# Patient Record
Sex: FEMALE | Race: BLACK OR AFRICAN AMERICAN | NOT HISPANIC OR LATINO | URBAN - METROPOLITAN AREA
[De-identification: names, ages, dates, MRNs, and addresses within clinical notes are randomized per-mention and may not be internally consistent; named-entity substitution may affect disease eponyms.]

---

## 2017-06-05 ENCOUNTER — EMERGENCY (EMERGENCY)
Facility: HOSPITAL | Age: 27
LOS: 1 days | Discharge: HOME | End: 2017-06-05
Attending: EMERGENCY MEDICINE | Admitting: EMERGENCY MEDICINE
Payer: MEDICAID

## 2017-06-05 VITALS
RESPIRATION RATE: 18 BRPM | SYSTOLIC BLOOD PRESSURE: 123 MMHG | HEART RATE: 62 BPM | DIASTOLIC BLOOD PRESSURE: 73 MMHG | OXYGEN SATURATION: 100 %

## 2017-06-05 VITALS
OXYGEN SATURATION: 100 % | TEMPERATURE: 98 F | DIASTOLIC BLOOD PRESSURE: 73 MMHG | HEART RATE: 65 BPM | RESPIRATION RATE: 17 BRPM | SYSTOLIC BLOOD PRESSURE: 125 MMHG

## 2017-06-05 DIAGNOSIS — R51 HEADACHE: ICD-10-CM

## 2017-06-05 LAB
ALBUMIN SERPL ELPH-MCNC: 3.9 G/DL — SIGNIFICANT CHANGE UP (ref 3.3–5)
ALP SERPL-CCNC: 44 U/L — SIGNIFICANT CHANGE UP (ref 40–120)
ALT FLD-CCNC: 9 U/L — SIGNIFICANT CHANGE UP (ref 4–33)
APPEARANCE UR: SIGNIFICANT CHANGE UP
AST SERPL-CCNC: 16 U/L — SIGNIFICANT CHANGE UP (ref 4–32)
BACTERIA # UR AUTO: SIGNIFICANT CHANGE UP
BASOPHILS # BLD AUTO: 0.02 K/UL — SIGNIFICANT CHANGE UP (ref 0–0.2)
BASOPHILS NFR BLD AUTO: 0.3 % — SIGNIFICANT CHANGE UP (ref 0–2)
BILIRUB SERPL-MCNC: 0.3 MG/DL — SIGNIFICANT CHANGE UP (ref 0.2–1.2)
BILIRUB UR-MCNC: NEGATIVE — SIGNIFICANT CHANGE UP
BLOOD UR QL VISUAL: NEGATIVE — SIGNIFICANT CHANGE UP
BUN SERPL-MCNC: 8 MG/DL — SIGNIFICANT CHANGE UP (ref 7–23)
CALCIUM SERPL-MCNC: 9.2 MG/DL — SIGNIFICANT CHANGE UP (ref 8.4–10.5)
CHLORIDE SERPL-SCNC: 98 MMOL/L — SIGNIFICANT CHANGE UP (ref 98–107)
CO2 SERPL-SCNC: 22 MMOL/L — SIGNIFICANT CHANGE UP (ref 22–31)
COLOR SPEC: SIGNIFICANT CHANGE UP
CREAT SERPL-MCNC: 0.74 MG/DL — SIGNIFICANT CHANGE UP (ref 0.5–1.3)
EOSINOPHIL # BLD AUTO: 0.13 K/UL — SIGNIFICANT CHANGE UP (ref 0–0.5)
EOSINOPHIL NFR BLD AUTO: 1.7 % — SIGNIFICANT CHANGE UP (ref 0–6)
GLUCOSE SERPL-MCNC: 104 MG/DL — HIGH (ref 70–99)
GLUCOSE UR-MCNC: NEGATIVE — SIGNIFICANT CHANGE UP
HCG SERPL-ACNC: SIGNIFICANT CHANGE UP MIU/ML
HCT VFR BLD CALC: 35 % — SIGNIFICANT CHANGE UP (ref 34.5–45)
HGB BLD-MCNC: 11.3 G/DL — LOW (ref 11.5–15.5)
IMM GRANULOCYTES NFR BLD AUTO: 0.4 % — SIGNIFICANT CHANGE UP (ref 0–1.5)
KETONES UR-MCNC: SIGNIFICANT CHANGE UP
LEUKOCYTE ESTERASE UR-ACNC: NEGATIVE — SIGNIFICANT CHANGE UP
LYMPHOCYTES # BLD AUTO: 2.64 K/UL — SIGNIFICANT CHANGE UP (ref 1–3.3)
LYMPHOCYTES # BLD AUTO: 34.6 % — SIGNIFICANT CHANGE UP (ref 13–44)
MCHC RBC-ENTMCNC: 26.4 PG — LOW (ref 27–34)
MCHC RBC-ENTMCNC: 32.3 % — SIGNIFICANT CHANGE UP (ref 32–36)
MCV RBC AUTO: 81.8 FL — SIGNIFICANT CHANGE UP (ref 80–100)
MONOCYTES # BLD AUTO: 0.58 K/UL — SIGNIFICANT CHANGE UP (ref 0–0.9)
MONOCYTES NFR BLD AUTO: 7.6 % — SIGNIFICANT CHANGE UP (ref 2–14)
MUCOUS THREADS # UR AUTO: SIGNIFICANT CHANGE UP
NEUTROPHILS # BLD AUTO: 4.23 K/UL — SIGNIFICANT CHANGE UP (ref 1.8–7.4)
NEUTROPHILS NFR BLD AUTO: 55.4 % — SIGNIFICANT CHANGE UP (ref 43–77)
NITRITE UR-MCNC: POSITIVE — HIGH
PH UR: 6.5 — SIGNIFICANT CHANGE UP (ref 4.6–8)
PLATELET # BLD AUTO: 328 K/UL — SIGNIFICANT CHANGE UP (ref 150–400)
PMV BLD: 9.8 FL — SIGNIFICANT CHANGE UP (ref 7–13)
POTASSIUM SERPL-MCNC: 4.4 MMOL/L — SIGNIFICANT CHANGE UP (ref 3.5–5.3)
POTASSIUM SERPL-SCNC: 4.4 MMOL/L — SIGNIFICANT CHANGE UP (ref 3.5–5.3)
PROT SERPL-MCNC: 7.4 G/DL — SIGNIFICANT CHANGE UP (ref 6–8.3)
PROT UR-MCNC: 10 — SIGNIFICANT CHANGE UP
RBC # BLD: 4.28 M/UL — SIGNIFICANT CHANGE UP (ref 3.8–5.2)
RBC # FLD: 16.4 % — HIGH (ref 10.3–14.5)
RBC CASTS # UR COMP ASSIST: SIGNIFICANT CHANGE UP (ref 0–?)
SODIUM SERPL-SCNC: 137 MMOL/L — SIGNIFICANT CHANGE UP (ref 135–145)
SP GR SPEC: 1.01 — SIGNIFICANT CHANGE UP (ref 1–1.03)
SQUAMOUS # UR AUTO: SIGNIFICANT CHANGE UP
UROBILINOGEN FLD QL: NORMAL E.U. — SIGNIFICANT CHANGE UP (ref 0.1–0.2)
WBC # BLD: 7.63 K/UL — SIGNIFICANT CHANGE UP (ref 3.8–10.5)
WBC # FLD AUTO: 7.63 K/UL — SIGNIFICANT CHANGE UP (ref 3.8–10.5)
WBC UR QL: SIGNIFICANT CHANGE UP (ref 0–?)

## 2017-06-05 PROCEDURE — 99285 EMERGENCY DEPT VISIT HI MDM: CPT | Mod: 25

## 2017-06-05 PROCEDURE — 70551 MRI BRAIN STEM W/O DYE: CPT | Mod: 26

## 2017-06-05 PROCEDURE — 76815 OB US LIMITED FETUS(S): CPT | Mod: 26

## 2017-06-05 RX ORDER — NITROFURANTOIN MACROCRYSTAL 50 MG
1 CAPSULE ORAL
Qty: 20 | Refills: 0 | OUTPATIENT
Start: 2017-06-05 | End: 2017-06-15

## 2017-06-05 RX ORDER — METOCLOPRAMIDE HCL 10 MG
10 TABLET ORAL ONCE
Qty: 0 | Refills: 0 | Status: COMPLETED | OUTPATIENT
Start: 2017-06-05 | End: 2017-06-05

## 2017-06-05 RX ORDER — NITROFURANTOIN MACROCRYSTAL 50 MG
100 CAPSULE ORAL ONCE
Qty: 0 | Refills: 0 | Status: COMPLETED | OUTPATIENT
Start: 2017-06-05 | End: 2017-06-05

## 2017-06-05 RX ORDER — SODIUM CHLORIDE 9 MG/ML
1000 INJECTION INTRAMUSCULAR; INTRAVENOUS; SUBCUTANEOUS ONCE
Qty: 0 | Refills: 0 | Status: COMPLETED | OUTPATIENT
Start: 2017-06-05 | End: 2017-06-05

## 2017-06-05 RX ADMIN — Medication 100 MILLIGRAM(S): at 17:37

## 2017-06-05 RX ADMIN — Medication 10 MILLIGRAM(S): at 16:48

## 2017-06-05 RX ADMIN — SODIUM CHLORIDE 2000 MILLILITER(S): 9 INJECTION INTRAMUSCULAR; INTRAVENOUS; SUBCUTANEOUS at 16:48

## 2017-06-05 NOTE — CONSULT NOTE ADULT - PROBLEM SELECTOR RECOMMENDATION 9
- will f/u results of MRI/MRV  - symptomatic migraine management as per ED team  - if imaging unremarkable, can f/u w/ Neurology as outpatient as needed

## 2017-06-05 NOTE — ED PROVIDER NOTE - ATTENDING CONTRIBUTION TO CARE
Dr. Maldonado: This H&P has been written by myself in its entirety  Dr. Maldonado: I have personally performed a face to face bedside history and physical examination of this patient. I have discussed the history, examination, review of systems, assessment and plan of management with the resident. I have reviewed the electronic medical record and amended it to reflect my history, review of systems, physical exam, assessment and plan.

## 2017-06-05 NOTE — ED ADULT NURSE NOTE - OBJECTIVE STATEMENT
pt c.o. headache, dizzyness, states she walked into a wall. states she is pregnant unsure of dates. bedside sono in progress. sl placed labs sent. to mri via wheelchair. denies blurry vision and head trauma

## 2017-06-05 NOTE — CONSULT NOTE ADULT - ASSESSMENT
26 y/o AA F w/ PMHx of migraine p/w 2d h/o HA similar in nature to migraine, but worse in severity. Etiology likely migraine, however, given pregnancy status and worse severity than regular migraines, will r/o venous sinus thrombosis.

## 2017-06-05 NOTE — ED PROVIDER NOTE - CARE PLAN
Principal Discharge DX:	Acute nonintractable headache, unspecified headache type Principal Discharge DX:	Acute nonintractable headache, unspecified headache type  Secondary Diagnosis:	Infective urethritis

## 2017-06-05 NOTE — ED ADULT TRIAGE NOTE - CHIEF COMPLAINT QUOTE
pt states she is approx 11 w preg as per a home preg test c/o of abd cramping, denies bleeding/discharge. has not seen a GYN with this pregnancy.

## 2017-06-05 NOTE — ED PROVIDER NOTE - EYES, MLM
Clear bilaterally, pupils equal, round and reactive to light. +papilledema bilaterally Clear bilaterally, pupils equal, round and reactive to light.

## 2017-06-05 NOTE — ED PROVIDER NOTE - OBJECTIVE STATEMENT
Dr. Maldonado: 27F  ~11 weeks pregnant (unsure of dates, no documented IUP, LMP 2 mos ago, irregular period), h/o migraines (usually takes Motrin), p/w 2 days of R sided throbbing headache different from usual migraines. Did not take anything for her HA as she is allergic to Tylenol. No nausea or vomiting, no blurry vision, one episode of yesterday where she "walked into a wall", now resolved. No abdo pain or vag bleeding. Fam h/o sinusitis and states has had sinusitis recently. No fevers or chills. +photophobia and phonophobia. No sudden onset HA. No fam h/o aneurysms or blood clots.  Pt had a miscarriage in Oct 2016. Dr. Maldonado: 27F  ~11 weeks pregnant (unsure of dates, no documented IUP, LMP 2 mos ago, irregular period), h/o migraines (usually takes Motrin), p/w 2 days of R sided throbbing headache different from usual migraines. Did not take anything for her HA as she is allergic to Tylenol. No nausea or vomiting, no blurry vision, one episode of yesterday where she "walked into a wall", now resolved. No abdo pain or vag bleeding. Fam h/o sinusitis and states has had sinusitis recently. No fevers or chills. +photophobia and phonophobia. No sudden onset HA. No fam h/o aneurysms or blood clots.  Pt had a miscarriage in Oct 2016.    Dr. Crandall:  27 y.o. female PMH migraines 7 weeks pregnant p/w headaches and low back pain x 2 days.  The headaches are different from her usual migraines.  She has a bad reaction to tylenol, and has not been taking NSAIDS due to her pregnancy.  She also had some dizziness.  NO personal or family history of blood clots.

## 2017-06-05 NOTE — CONSULT NOTE ADULT - SUBJECTIVE AND OBJECTIVE BOX
Neurology Consult    Patient is a 27y old  Female who presents with a chief complaint of headache.    HPI:27 y/o AA F PMHx migraines w/ aura, 11 wks pregnant p/w 2 day history of gradual onset R sided throbbing HA, worse at night, at its maximum 15/10, radiating to R temporal and R neck, associated w/ light sensitivity. Currently, pain 5/10. Migraines usually R periorbital/frontal, not as severe, assoc. w/ photophobia and phonophobia. Associated symptoms: nausea/vomiting (from before 2/2 to pregnancy), chills.  Denies fever, weakness, sensory changes, abd pain, chest pain, dysarthria, dysphagia.      PAST MEDICAL & SURGICAL HISTORY:  Migraines  No pertinent past medical history  No significant past surgical history      Allergies    Benadryl (Hives)  cortisone (Hives)  pcn (Unknown)  Tylenol (Hives)  Tylenol (Urticaria)  Zofran (Anaphylaxis)  Zofran (Rash)    Intolerances        MEDICATIONS  (STANDING):  metoclopramide Injectable 10milliGRAM(s) IV Push once  sodium chloride 0.9% Bolus 1000milliLiter(s) IV Bolus once    MEDICATIONS  (PRN):      Social History: Denies tob, EtOH use     FAMILY HISTORY:      Review of Systems:  CONSTITUTIONAL:  No weight loss, fever, chills, weakness or fatigue.  HEENT:  Eyes:  No visual loss, blurred vision, double vision or yellow sclerae. Ears, Nose, Throat:  No hearing loss, sneezing, congestion, runny nose or sore throat.  SKIN:  No rash or itching.  CARDIOVASCULAR:  No chest pain, chest pressure or chest discomfort. No palpitations or edema.  RESPIRATORY:  No shortness of breath, cough or sputum.  GASTROINTESTINAL:  No anorexia, nausea, vomiting or diarrhea. No abdominal pain or blood.  GENITOURINARY:  Burning on urination. Pregnancy. Last menstrual period, MM/DD/YYYY.  NEUROLOGICAL:  No headache, dizziness, syncope, paralysis, ataxia, numbness or tingling in the extremities. No change in bowel or bladder control.  MUSCULOSKELETAL:  No muscle, back pain, joint pain or stiffness.  HEMATOLOGIC:  No anemia, bleeding or bruising.  LYMPHATICS:  No enlarged nodes. No history of splenectomy.  PSYCHIATRIC:  No history of depression or anxiety.  ENDOCRINOLOGIC:  No reports of sweating, cold or heat intolerance. No polyuria or polydipsia.      Physical Exam:   Vital Signs Last 24 Hrs  T(C): 36.8, Max: 36.8 (06-05 @ 13:10)  T(F): 98.2, Max: 98.2 (06-05 @ 13:10)  HR: 65 (65 - 65)  BP: 125/73 (125/73 - 125/73)  BP(mean): --  RR: 17 (17 - 17)  SpO2: 100% (100% - 100%)    General Exam:   General appearance: No acute distress                 Neurological Exam:  Mental Status: AAOx3, fluent speech, follows simple commands, able to name  Cranial Nerves: EOMI, PERRL, VFF, V1-V3 intact, facial symmetric, no dysarthria, tongue midline  Motor: strength 5/5 throughout. No drift x4  Sensation: Intact to LT throughout  Coordination: FTN intact b/l  Reflexes: 1+ bilateral biceps, brachioradialis, patellar and ankle  Gait: normal and stable.      Labs:     CBC Full  -  ( 05 Jun 2017 15:28 )  WBC Count : 7.63 K/uL  Hemoglobin : 11.3 g/dL  Hematocrit : 35.0 %  Platelet Count - Automated : 328 K/uL  Mean Cell Volume : 81.8 fL  Mean Cell Hemoglobin : 26.4 pg  Mean Cell Hemoglobin Concentration : 32.3 %  Auto Neutrophil # : 4.23 K/uL  Auto Lymphocyte # : 2.64 K/uL  Auto Monocyte # : 0.58 K/uL  Auto Eosinophil # : 0.13 K/uL  Auto Basophil # : 0.02 K/uL  Auto Neutrophil % : 55.4 %  Auto Lymphocyte % : 34.6 %  Auto Monocyte % : 7.6 %  Auto Eosinophil % : 1.7 %  Auto Basophil % : 0.3 %    06-05    137  |  98  |  8   ----------------------------<  104<H>  4.4   |  22  |  0.74    Ca    9.2      05 Jun 2017 15:28    TPro  7.4  /  Alb  3.9  /  TBili  0.3  /  DBili  x   /  AST  16  /  ALT  9   /  AlkPhos  44  06-05    LIVER FUNCTIONS - ( 05 Jun 2017 15:28 )  Alb: 3.9 g/dL / Pro: 7.4 g/dL / ALK PHOS: 44 u/L / ALT: 9 u/L / AST: 16 u/L / GGT: x

## 2017-06-06 LAB — SPECIMEN SOURCE: SIGNIFICANT CHANGE UP

## 2017-06-07 LAB
-  AMIKACIN: SIGNIFICANT CHANGE UP
-  AMPICILLIN/SULBACTAM: SIGNIFICANT CHANGE UP
-  AMPICILLIN: SIGNIFICANT CHANGE UP
-  AZTREONAM: SIGNIFICANT CHANGE UP
-  CEFAZOLIN: SIGNIFICANT CHANGE UP
-  CEFEPIME: SIGNIFICANT CHANGE UP
-  CEFOXITIN: SIGNIFICANT CHANGE UP
-  CEFTAZIDIME: SIGNIFICANT CHANGE UP
-  CEFTRIAXONE: SIGNIFICANT CHANGE UP
-  CIPROFLOXACIN: SIGNIFICANT CHANGE UP
-  ERTAPENEM: SIGNIFICANT CHANGE UP
-  GENTAMICIN: SIGNIFICANT CHANGE UP
-  IMIPENEM: SIGNIFICANT CHANGE UP
-  LEVOFLOXACIN: SIGNIFICANT CHANGE UP
-  MEROPENEM: SIGNIFICANT CHANGE UP
-  NITROFURANTOIN: SIGNIFICANT CHANGE UP
-  PIPERACILLIN/TAZOBACTAM: SIGNIFICANT CHANGE UP
-  TIGECYCLINE: SIGNIFICANT CHANGE UP
-  TOBRAMYCIN: SIGNIFICANT CHANGE UP
-  TRIMETHOPRIM/SULFAMETHOXAZOLE: SIGNIFICANT CHANGE UP
BACTERIA UR CULT: SIGNIFICANT CHANGE UP
METHOD TYPE: SIGNIFICANT CHANGE UP
ORGANISM # SPEC MICROSCOPIC CNT: SIGNIFICANT CHANGE UP
ORGANISM # SPEC MICROSCOPIC CNT: SIGNIFICANT CHANGE UP

## 2017-06-07 NOTE — ED POST DISCHARGE NOTE - RESULT SUMMARY
UCX: Gram negative yamil > 100,000 prelim. Admin Team to follow results to final. Patient discharged home with a prescription for Macrobid. Patient is pregnant.

## 2017-06-14 ENCOUNTER — RESULT REVIEW (OUTPATIENT)
Age: 27
End: 2017-06-14

## 2017-06-28 ENCOUNTER — INPATIENT (INPATIENT)
Facility: HOSPITAL | Age: 27
LOS: 8 days | Discharge: HOME | End: 2017-07-07
Attending: HOSPITALIST | Admitting: HOSPITALIST
Payer: MEDICAID

## 2017-06-28 VITALS
OXYGEN SATURATION: 100 % | DIASTOLIC BLOOD PRESSURE: 72 MMHG | HEART RATE: 76 BPM | RESPIRATION RATE: 16 BRPM | TEMPERATURE: 98 F | SYSTOLIC BLOOD PRESSURE: 126 MMHG

## 2017-06-28 DIAGNOSIS — H47.10 UNSPECIFIED PAPILLEDEMA: ICD-10-CM

## 2017-06-28 DIAGNOSIS — R51 HEADACHE: ICD-10-CM

## 2017-06-28 LAB
ALBUMIN SERPL ELPH-MCNC: 3.8 G/DL — SIGNIFICANT CHANGE UP (ref 3.3–5)
ALP SERPL-CCNC: 50 U/L — SIGNIFICANT CHANGE UP (ref 40–120)
ALT FLD-CCNC: 14 U/L — SIGNIFICANT CHANGE UP (ref 4–33)
APTT BLD: 29.7 SEC — SIGNIFICANT CHANGE UP (ref 27.5–37.4)
AST SERPL-CCNC: 12 U/L — SIGNIFICANT CHANGE UP (ref 4–32)
BASOPHILS # BLD AUTO: 0.02 K/UL — SIGNIFICANT CHANGE UP (ref 0–0.2)
BASOPHILS NFR BLD AUTO: 0.2 % — SIGNIFICANT CHANGE UP (ref 0–2)
BILIRUB SERPL-MCNC: < 0.2 MG/DL — LOW (ref 0.2–1.2)
BUN SERPL-MCNC: 13 MG/DL — SIGNIFICANT CHANGE UP (ref 7–23)
CALCIUM SERPL-MCNC: 9.5 MG/DL — SIGNIFICANT CHANGE UP (ref 8.4–10.5)
CHLORIDE SERPL-SCNC: 97 MMOL/L — LOW (ref 98–107)
CO2 SERPL-SCNC: 23 MMOL/L — SIGNIFICANT CHANGE UP (ref 22–31)
CREAT SERPL-MCNC: 0.75 MG/DL — SIGNIFICANT CHANGE UP (ref 0.5–1.3)
EOSINOPHIL # BLD AUTO: 0.16 K/UL — SIGNIFICANT CHANGE UP (ref 0–0.5)
EOSINOPHIL NFR BLD AUTO: 1.7 % — SIGNIFICANT CHANGE UP (ref 0–6)
GLUCOSE SERPL-MCNC: 79 MG/DL — SIGNIFICANT CHANGE UP (ref 70–99)
HCG SERPL-ACNC: SIGNIFICANT CHANGE UP MIU/ML
HCT VFR BLD CALC: 36.2 % — SIGNIFICANT CHANGE UP (ref 34.5–45)
HGB BLD-MCNC: 11.7 G/DL — SIGNIFICANT CHANGE UP (ref 11.5–15.5)
IMM GRANULOCYTES # BLD AUTO: 0.03 # — SIGNIFICANT CHANGE UP
IMM GRANULOCYTES NFR BLD AUTO: 0.3 % — SIGNIFICANT CHANGE UP (ref 0–1.5)
INR BLD: 1.01 — SIGNIFICANT CHANGE UP (ref 0.88–1.17)
LYMPHOCYTES # BLD AUTO: 2.16 K/UL — SIGNIFICANT CHANGE UP (ref 1–3.3)
LYMPHOCYTES # BLD AUTO: 22.4 % — SIGNIFICANT CHANGE UP (ref 13–44)
MCHC RBC-ENTMCNC: 26.4 PG — LOW (ref 27–34)
MCHC RBC-ENTMCNC: 32.3 % — SIGNIFICANT CHANGE UP (ref 32–36)
MCV RBC AUTO: 81.7 FL — SIGNIFICANT CHANGE UP (ref 80–100)
MONOCYTES # BLD AUTO: 1.1 K/UL — HIGH (ref 0–0.9)
MONOCYTES NFR BLD AUTO: 11.4 % — SIGNIFICANT CHANGE UP (ref 2–14)
NEUTROPHILS # BLD AUTO: 6.19 K/UL — SIGNIFICANT CHANGE UP (ref 1.8–7.4)
NEUTROPHILS NFR BLD AUTO: 64 % — SIGNIFICANT CHANGE UP (ref 43–77)
NRBC # FLD: 0 — SIGNIFICANT CHANGE UP
PLATELET # BLD AUTO: 239 K/UL — SIGNIFICANT CHANGE UP (ref 150–400)
PMV BLD: 9.9 FL — SIGNIFICANT CHANGE UP (ref 7–13)
POTASSIUM SERPL-MCNC: 4.2 MMOL/L — SIGNIFICANT CHANGE UP (ref 3.5–5.3)
POTASSIUM SERPL-SCNC: 4.2 MMOL/L — SIGNIFICANT CHANGE UP (ref 3.5–5.3)
PROT SERPL-MCNC: 7.5 G/DL — SIGNIFICANT CHANGE UP (ref 6–8.3)
PROTHROM AB SERPL-ACNC: 11.3 SEC — SIGNIFICANT CHANGE UP (ref 9.8–13.1)
RBC # BLD: 4.43 M/UL — SIGNIFICANT CHANGE UP (ref 3.8–5.2)
RBC # FLD: 16.1 % — HIGH (ref 10.3–14.5)
SODIUM SERPL-SCNC: 135 MMOL/L — SIGNIFICANT CHANGE UP (ref 135–145)
WBC # BLD: 9.66 K/UL — SIGNIFICANT CHANGE UP (ref 3.8–10.5)
WBC # FLD AUTO: 9.66 K/UL — SIGNIFICANT CHANGE UP (ref 3.8–10.5)

## 2017-06-28 RX ORDER — METOCLOPRAMIDE HCL 10 MG
10 TABLET ORAL ONCE
Qty: 0 | Refills: 0 | Status: COMPLETED | OUTPATIENT
Start: 2017-06-28 | End: 2017-06-28

## 2017-06-28 RX ADMIN — Medication 10 MILLIGRAM(S): at 18:49

## 2017-06-28 NOTE — CONSULT NOTE ADULT - ASSESSMENT
28yo female  at 11 weeks (+IUP by US) PMH migraines p/w worsening HA, blurry vision and sent in by ophthalmologist for papilledema endorses whooshing sounds. PE decreased acuity b/l papilledema reviewed MRV from  with Neurorads hypoplastic vs stenotic left transverse and sigmoid 28yo female  at 11 weeks (+IUP by US) PMH migraines p/w worsening HA, blurry vision and sent in by ophthalmologist for papilledema endorses whooshing sounds. PE decreased acuity b/l papilledema reviewed MRV from  with Neurorads hypoplastic vs stenotic left transverse and sigmoid. need to r/o sinus thrombosis vs IIH

## 2017-06-28 NOTE — CONSULT NOTE ADULT - ATTENDING COMMENTS
Pt seen and examined.    ddx includes benign intracranial hypertension, migraine exacerbation (although does not account for papilledema).     Recommend LP to see if that has symptomatic effect.

## 2017-06-28 NOTE — ED ADULT NURSE NOTE - OBJECTIVE STATEMENT
pt. is a 26 y/o female a&ox4 11 weeks pregnant presenting with a c/c of migraines and blurry vision.  patient reports a hx og migraines that have worsened for the past month, denies seizures.  No swelling in face, hands or feet noted, patient reports photosensitivity, denies visual loss.  Denies SOB, CP, pregnancy related complications.  20 gauge IV placed in right AC, VSS, patient in nad, will continue to monitor.

## 2017-06-28 NOTE — ED ADULT TRIAGE NOTE - CHIEF COMPLAINT QUOTE
Patient 11 weeks pregnant  sent in from ophthalmologist for LP. Diagnosed with Grade 4 papilledema. R/O thrombosis/ infection. Patient c/o intermittent migraines.

## 2017-06-28 NOTE — ED PROVIDER NOTE - BOTH EYES:     20/
Decreased bilaterally, able to discern finger count from 10 feet in each eye. EOMI PERRL. Unable to visualize optic discs bilaterally

## 2017-06-28 NOTE — ED PROVIDER NOTE - OBJECTIVE STATEMENT
26yo female  at 11 weeks (+IUP by US) sent in by ophthalmologist for papillaedema. C/o vision blurriness for several weeks while pregnant, normally perfect vision. C/o migraines daily for the extent of the pregnancy. Migraines in past, usually monthly. Headache diffuse, dull, mild photophobia, phonophobia. +vomiting 3 days ago, none today or yesterday. Denies fevers, chest pain, dyspnea, abdominal pain, diarrhea, recent travel or illness, problems ambulating, incontinence. MRI/MRV negative 17 negative for similar complaints.

## 2017-06-28 NOTE — ED PROVIDER NOTE - GASTROINTESTINAL NEGATIVE STATEMENT, MLM
no abdominal pain, no bloating, no constipation, no diarrhea, no nausea and no vomiting. no abdominal pain, no bloating, no constipation, no diarrhea, mild intermittent vomiting

## 2017-06-28 NOTE — ED PROVIDER NOTE - PROGRESS NOTE DETAILS
Neuro recommends CDU for MRI/MRV and follow up LP with opening pressure   Neuro recommends CDU for MRV and follow up LP after mr with opening pressure Bed was placed in CDU but now bed is not available. Will admit to medicine.

## 2017-06-28 NOTE — ED PROVIDER NOTE - NOTES
neuro states symptoms for months. Since limited study with no contrast will eval mrv again prior to lp. neuro states symptoms for months. Since limited study with no contrast (since pregnant), some stenosis on mrv, will eval with mrv again prior to lp.

## 2017-06-28 NOTE — CONSULT NOTE ADULT - PROBLEM SELECTOR RECOMMENDATION 9
repeat MRV w/o con, if negative get LP with OP if elevated OP would do high volume tap repeat MRV w/o con, if unchanged get LP with OP if elevated OP would do high volume tap

## 2017-06-28 NOTE — CONSULT NOTE ADULT - SUBJECTIVE AND OBJECTIVE BOX
Neurology consult    JULISSA CALLOWAYS27yFemale     Patient is a 27y old  Female who presents with a chief complaint of     HPI:  28yo female  at 11 weeks (+IUP by US) PMH migraines sent in by ophthalmologist for papillaedema. C/o vision blurriness for several weeks while pregnant, normally perfect vision. C/o migraines daily for the extent of the pregnancy. Migraines in past, usually monthly. Headache diffuse, dull, +photophobia, phonophobia. +vomiting 3 days ago. Pt endorses wave klike sounds in her ears at night. Typical migraines right sided this is involves entire head. difficulty reading diplopia at times. Denies fevers, chest pain, dyspnea, abdominal pain, diarrhea, recent travel or illness, problems ambulating, incontinence, dysarthria aphasia, slurred speech . MRI/MRV w/o con  on 17 for similar complaints showed some stenosis, no thrombosis        REVIEW OF SYSTEMS:    see HPI    MEDICATIONS  prenatals      PMH: Migraines  No pertinent past medical history       PSH: No significant past surgical history      Family history: No history of dementia, strokes, or seizures   FAMILY HISTORY:      SOCIAL HISTORY:  No history of tobacco or alcohol use     Allergies    Benadryl (Hives)  cortisone (Hives)  pcn (Unknown)  Tylenol (Hives)  Tylenol (Urticaria)  Zofran (Anaphylaxis)  Zofran (Rash)    Intolerances            Vital Signs Last 24 Hrs  T(C): 36.8 (2017 18:11), Max: 36.9 (2017 16:14)  T(F): 98.2 (2017 18:11), Max: 98.4 (2017 16:14)  HR: 82 (2017 19:09) (76 - 88)  BP: 128/88 (2017 19:09) (126/72 - 152/95)  BP(mean): --  RR: 18 (2017 19:09) (16 - 18)  SpO2: 98% (2017 19:09) (96% - 100%)      On Neurological Examination:    Mental Status - Patient is alert, awake, oriented X3. fluent, names, no dysarthria no aphasia Follows commands well and able to answer questions appropriately. Mood and affect  normal    Cranial Nerves - PERRL, EOMI, VFF with decreased visual acuity b/l, V1-V3 intact, no gross facial asymmetry, tongue/uvula midline Fundi: papilledema b/l    Motor Exam - 5/5 throughout no drift    Sensory    Intact to light touch and pinprick bilaterally    Coord: FTN intact bilaterally     Gait -  normal                                                            GENERAL Exam:     Nontoxic , No Acute Distress   	  HEENT:  normocephalic, atraumatic  		  LUNGS:	Clear bilaterally  No Wheeze  Decreased bilaterally  	  HEART:	 Normal S1S2   No murmur RRR        	  GI/ ABDOMEN:  Soft  Non tender    EXTREMITIES:   No Edema  No Clubbing  No Cyanosis No Edema    MUSCULOSKELETAL: Normal Range of Motion  	   SKIN:      Normal   No Ecchymosis               LABS:  CBC Full  -  ( 2017 18:40 )  WBC Count : 9.66 K/uL  Hemoglobin : 11.7 g/dL  Hematocrit : 36.2 %  Platelet Count - Automated : 239 K/uL  Mean Cell Volume : 81.7 fL  Mean Cell Hemoglobin : 26.4 pg  Mean Cell Hemoglobin Concentration : 32.3 %  Auto Neutrophil # : 6.19 K/uL  Auto Lymphocyte # : 2.16 K/uL  Auto Monocyte # : 1.10 K/uL  Auto Eosinophil # : 0.16 K/uL  Auto Basophil # : 0.02 K/uL  Auto Neutrophil % : 64.0 %  Auto Lymphocyte % : 22.4 %  Auto Monocyte % : 11.4 %  Auto Eosinophil % : 1.7 %  Auto Basophil % : 0.2 %      PT/INR - ( 2017 18:40 )   PT: 11.3 SEC;   INR: 1.01          PTT - ( 2017 18:40 )  PTT:29.7 SEC Neurology consult    JULISSA CALLOWAYS27yFemale     Patient is a 27y old  Female who presents with a chief complaint of     HPI:  26yo female  at 11 weeks (+IUP by US) PMH migraines sent in by ophthalmologist for papillaedema. C/o vision blurriness for several weeks while pregnant, normally perfect vision. C/o migraines daily for the extent of the pregnancy. Migraines in past, usually monthly. Headache diffuse, dull, +photophobia, phonophobia. +vomiting 3 days ago. Pt endorses wave like sounds in her ears at night. Typical migraines right sided this is involves entire head. difficulty reading diplopia at times. Denies fevers, chest pain, dyspnea, abdominal pain, diarrhea, recent travel or illness, problems ambulating, incontinence, dysarthria aphasia, slurred speech . MRI/MRV w/o con  on 17 for similar complaints showed some stenosis, no thrombosis        REVIEW OF SYSTEMS:    see HPI    MEDICATIONS  prenatals      PMH: Migraines  No pertinent past medical history       PSH: No significant past surgical history      Family history: No history of dementia, strokes, or seizures   FAMILY HISTORY:      SOCIAL HISTORY:  No history of tobacco or alcohol use     Allergies    Benadryl (Hives)  cortisone (Hives)  pcn (Unknown)  Tylenol (Hives)  Tylenol (Urticaria)  Zofran (Anaphylaxis)  Zofran (Rash)    Intolerances            Vital Signs Last 24 Hrs  T(C): 36.8 (2017 18:11), Max: 36.9 (2017 16:14)  T(F): 98.2 (2017 18:11), Max: 98.4 (2017 16:14)  HR: 82 (2017 19:09) (76 - 88)  BP: 128/88 (2017 19:09) (126/72 - 152/95)  BP(mean): --  RR: 18 (2017 19:09) (16 - 18)  SpO2: 98% (2017 19:09) (96% - 100%)      On Neurological Examination:    Mental Status - Patient is alert, awake, oriented X3. fluent, names, no dysarthria no aphasia Follows commands well and able to answer questions appropriately. Mood and affect  normal    Cranial Nerves - PERRL, EOMI, VFF with decreased visual acuity b/l, V1-V3 intact, no gross facial asymmetry, tongue/uvula midline Fundi: papilledema b/l    Motor Exam - 5/5 throughout no drift    Sensory    Intact to light touch and pinprick bilaterally    Coord: FTN intact bilaterally     Gait -  normal                                                            GENERAL Exam:     Nontoxic , No Acute Distress   	  HEENT:  normocephalic, atraumatic  		  LUNGS:	Clear bilaterally  No Wheeze  Decreased bilaterally  	  HEART:	 Normal S1S2   No murmur RRR        	  GI/ ABDOMEN:  Soft  Non tender    EXTREMITIES:   No Edema  No Clubbing  No Cyanosis No Edema    MUSCULOSKELETAL: Normal Range of Motion  	   SKIN:      Normal   No Ecchymosis               LABS:  CBC Full  -  ( 2017 18:40 )  WBC Count : 9.66 K/uL  Hemoglobin : 11.7 g/dL  Hematocrit : 36.2 %  Platelet Count - Automated : 239 K/uL  Mean Cell Volume : 81.7 fL  Mean Cell Hemoglobin : 26.4 pg  Mean Cell Hemoglobin Concentration : 32.3 %  Auto Neutrophil # : 6.19 K/uL  Auto Lymphocyte # : 2.16 K/uL  Auto Monocyte # : 1.10 K/uL  Auto Eosinophil # : 0.16 K/uL  Auto Basophil # : 0.02 K/uL  Auto Neutrophil % : 64.0 %  Auto Lymphocyte % : 22.4 %  Auto Monocyte % : 11.4 %  Auto Eosinophil % : 1.7 %  Auto Basophil % : 0.2 %      PT/INR - ( 2017 18:40 )   PT: 11.3 SEC;   INR: 1.01          PTT - ( 2017 18:40 )  PTT:29.7 SEC

## 2017-06-28 NOTE — ED PROVIDER NOTE - NEUROLOGICAL, MLM
Alert and oriented, no focal deficits, no motor or sensory deficits. Normal finger to nose. No pronator drift

## 2017-06-28 NOTE — ED PROVIDER NOTE - ATTENDING CONTRIBUTION TO CARE
att26 y/o f  11 wks, presents for daily migraines and blurred vision with papilledema. Recent MRI/MRV  neg for same symptoms. No fever. Nontoxic appearing. HA with intermittent vomiting. Normal neuro exam. Limited eye exam in ED. FHR +. Discussed with optho, neuro since MRI was about 1 month ago. Neuro saw patient and recommends rp MRV w/o contrast and then consider lp after. Neuro plan for cdu. optho will see pt in the am.  I have personally performed a face to face bedside history and physical examination of this patient. I have discussed the history, examination, review of systems, assessment and plan of management with the resident. I have reviewed the electronic medical record and amended it to reflect my history, review of systems, physical exam, assessment and plan.

## 2017-06-28 NOTE — ED PROVIDER NOTE - MEDICAL DECISION MAKING DETAILS
27F  at 11w p/w papillaedema, blurred vision otherwise normal neuro exam. MRI[facility]V [facility] . [facility]. Labs, may require admission for MRI prior to LP 27F  at 11w p/w papillaedema, blurred vision otherwise normal neuro exam. [facility]V [facility] . [facility]. Labs, may require admission for MRI prior to LP since done 1 month ago.

## 2017-06-29 DIAGNOSIS — G43.909 MIGRAINE, UNSPECIFIED, NOT INTRACTABLE, WITHOUT STATUS MIGRAINOSUS: ICD-10-CM

## 2017-06-29 DIAGNOSIS — H53.143 VISUAL DISCOMFORT, BILATERAL: ICD-10-CM

## 2017-06-29 DIAGNOSIS — Z29.9 ENCOUNTER FOR PROPHYLACTIC MEASURES, UNSPECIFIED: ICD-10-CM

## 2017-06-29 DIAGNOSIS — R63.8 OTHER SYMPTOMS AND SIGNS CONCERNING FOOD AND FLUID INTAKE: ICD-10-CM

## 2017-06-29 DIAGNOSIS — Z3A.11 11 WEEKS GESTATION OF PREGNANCY: ICD-10-CM

## 2017-06-29 DIAGNOSIS — O23.41 UNSPECIFIED INFECTION OF URINARY TRACT IN PREGNANCY, FIRST TRIMESTER: ICD-10-CM

## 2017-06-29 DIAGNOSIS — G44.59 OTHER COMPLICATED HEADACHE SYNDROME: ICD-10-CM

## 2017-06-29 LAB
BUN SERPL-MCNC: 8 MG/DL — SIGNIFICANT CHANGE UP (ref 7–23)
CALCIUM SERPL-MCNC: 8.9 MG/DL — SIGNIFICANT CHANGE UP (ref 8.4–10.5)
CHLORIDE SERPL-SCNC: 101 MMOL/L — SIGNIFICANT CHANGE UP (ref 98–107)
CO2 SERPL-SCNC: 25 MMOL/L — SIGNIFICANT CHANGE UP (ref 22–31)
CREAT SERPL-MCNC: 0.61 MG/DL — SIGNIFICANT CHANGE UP (ref 0.5–1.3)
GLUCOSE SERPL-MCNC: 72 MG/DL — SIGNIFICANT CHANGE UP (ref 70–99)
MAGNESIUM SERPL-MCNC: 1.7 MG/DL — SIGNIFICANT CHANGE UP (ref 1.6–2.6)
PHOSPHATE SERPL-MCNC: 3.5 MG/DL — SIGNIFICANT CHANGE UP (ref 2.5–4.5)
POTASSIUM SERPL-MCNC: 4.3 MMOL/L — SIGNIFICANT CHANGE UP (ref 3.5–5.3)
POTASSIUM SERPL-SCNC: 4.3 MMOL/L — SIGNIFICANT CHANGE UP (ref 3.5–5.3)
SODIUM SERPL-SCNC: 136 MMOL/L — SIGNIFICANT CHANGE UP (ref 135–145)
TSH SERPL-MCNC: 0.72 UIU/ML — SIGNIFICANT CHANGE UP (ref 0.27–4.2)

## 2017-06-29 PROCEDURE — 99223 1ST HOSP IP/OBS HIGH 75: CPT

## 2017-06-29 PROCEDURE — 99233 SBSQ HOSP IP/OBS HIGH 50: CPT

## 2017-06-29 PROCEDURE — 70544 MR ANGIOGRAPHY HEAD W/O DYE: CPT | Mod: 26

## 2017-06-29 RX ORDER — SODIUM CHLORIDE 9 MG/ML
1000 INJECTION INTRAMUSCULAR; INTRAVENOUS; SUBCUTANEOUS
Qty: 0 | Refills: 0 | Status: DISCONTINUED | OUTPATIENT
Start: 2017-06-29 | End: 2017-07-01

## 2017-06-29 RX ORDER — SODIUM CHLORIDE 9 MG/ML
1000 INJECTION INTRAMUSCULAR; INTRAVENOUS; SUBCUTANEOUS ONCE
Qty: 0 | Refills: 0 | Status: COMPLETED | OUTPATIENT
Start: 2017-06-29 | End: 2017-06-29

## 2017-06-29 RX ORDER — CIPROFLOXACIN LACTATE 400MG/40ML
400 VIAL (ML) INTRAVENOUS EVERY 12 HOURS
Qty: 0 | Refills: 0 | Status: DISCONTINUED | OUTPATIENT
Start: 2017-06-29 | End: 2017-07-02

## 2017-06-29 RX ADMIN — Medication 200 MILLIGRAM(S): at 18:18

## 2017-06-29 RX ADMIN — Medication 200 MILLIGRAM(S): at 06:23

## 2017-06-29 RX ADMIN — SODIUM CHLORIDE 100 MILLILITER(S): 9 INJECTION INTRAMUSCULAR; INTRAVENOUS; SUBCUTANEOUS at 09:56

## 2017-06-29 RX ADMIN — SODIUM CHLORIDE 100 MILLILITER(S): 9 INJECTION INTRAMUSCULAR; INTRAVENOUS; SUBCUTANEOUS at 20:54

## 2017-06-29 RX ADMIN — SODIUM CHLORIDE 1000 MILLILITER(S): 9 INJECTION INTRAMUSCULAR; INTRAVENOUS; SUBCUTANEOUS at 03:23

## 2017-06-29 NOTE — H&P ADULT - ASSESSMENT
27 year old female at 11 weeks gestation (), with PMH of Migraine headaches, sent to the ED by ophthalmologist secondary to Papilledema.  Vital signs upon ED presentation as follows: BP = 126/72, HR = 76, RR = 16, T = 36.9 C (98.4 F), O2 Sat = 100% on RA.  Diagnosed with Headache and 11 Weeks of Pregnancy.  Admitted for further management of Headache, Photophobia, 11 Weeks of pregnancy, Urinary tract infection...

## 2017-06-29 NOTE — DISCHARGE NOTE ADULT - PATIENT PORTAL LINK FT
“You can access the [facility] Patient Portal, offered by [facility], by registering with the following website: [email or web address]”

## 2017-06-29 NOTE — DISCHARGE NOTE ADULT - CONDITIONS AT DISCHARGE
Patient is awake, alert and oriented x 3. Respiration even and non-labored. No c/o chest pain, shortness or any discomfort. No acute distress.

## 2017-06-29 NOTE — H&P ADULT - PROBLEM SELECTOR PLAN 1
- still symptomatic and "intense" x > 2 months  -  unlike usual migraine headaches  - associated with photophobia  - associated with eye pain, blurred vision  - IVF hydration  - has multiple drug allergies; caution with prescriptions  - currently pregnant and has not been taking analgesics  - had previous MRI and MRV of brain with negative findings  - neurology consult appreciated; f/u MRV  - restrict light, increased sounds as much as feasible  - consider supplemental oxygen if necessary

## 2017-06-29 NOTE — H&P ADULT - PROBLEM SELECTOR PLAN 3
- progressing without difficulties  - followed by OP OB/GYN  - prenatal vitamins  - ensure adequate hydration (s/p 1 liter NS IVF bolus and continued on maintenance at 100 mL/Hr x 24 hours)

## 2017-06-29 NOTE — H&P ADULT - HISTORY OF PRESENT ILLNESS
27 year old female at 11 weeks gestation (), with PMH of Migraine headaches, sent to the ED by ophthalmologist secondary to Papilledema.  Seen and evaluated at bedside;      Vital signs upon ED presentation as follows: BP = 126/72, HR = 76, RR = 16, T = 36.9 C (98.4 F), O2 Sat = 100% on RA.  Diagnosed with Headache and 11 Weeks of Pregnancy.  Prescribed Metoclopramide 10 mg IV x one in the ED. 27 year old female at 11 weeks gestation (), with PMH of Migraine headaches, sent to the ED by ophthalmologist secondary to Papilledema.  Seen and evaluated at bedside; NAD.  Lying supine in bed in the darkness secondary to worsening headaches with any light.  Patient relates that some weeks ago, she began to experience right sided headache associated with neck pain and rhinorrhea.   Pain would start in the late evening (around 8:00 PM) and last for a few hours, then dissipate.  However pain became constant over time, progressed to involve the entire head, and has been intense for the past 2 months.  Endorses B/L ophthalmalgia, along with photophobia and phonophobia to some sounds at times.  Headache at times worsens just after getting up.  Has blurred vision, which is pronounced at times, then improves to baseline blurred state.  On reclining at times, patient hears annoying ocean -like sounds in the ears (alternately; never simultaneous involvement).  Patient presented to the [facility] ED on 2017 and underwent MRI of the head without contrast and MRI Venogram of the head without contrast; both studies were negative for any acute pathology.  After discharge, patient followed up with her PMD and OB/GYN; referred to ophthalmologist and neurologist by OB/GYN.  Patient saw an ophthalmologist today, who sent her to the ED for evaluation of Papilledema.  Neurology appointment was scheduled for next week.  No prior similar episodes, although patient has been suffering with migraine headaches for years.  No reports of fevers, chills, neurological deficits.  Had episode of NBNB vomiting 3 days ago.    Notedly diagnosed with UTI (Klebsiella pneumoniae) during the ED visit on 2017.  Prescribed Macrobid initially and then called by ED for change in antibiotic to Cipro.  Inadequate intake of antibiotic, however, because of nausea/vomiting and patient only resumed the therapy yesterday.  Notes burning with micturition prior to ED.    Vital signs upon ED presentation as follows: BP = 126/72, HR = 76, RR = 16, T = 36.9 C (98.4 F), O2 Sat = 100% on RA.  Diagnosed with Headache and 11 Weeks of Pregnancy.  Prescribed Metoclopramide 10 mg IV x one in the ED.

## 2017-06-29 NOTE — DISCHARGE NOTE ADULT - CARE PLAN
Principal Discharge DX:	Other complicated headache syndrome  Goal:	resolution of headache  Instructions for follow-up, activity and diet:	Neurology consulted- repeat MRV performed. Neuro performed LP___________________________________.  Follow up with neurology outpatient, call for appointment [phone]  Secondary Diagnosis:	Papilledema, both eyes  Secondary Diagnosis:	Photophobia, bilateral  Secondary Diagnosis:	11 weeks gestation of pregnancy Principal Discharge DX:	Other complicated headache syndrome  Goal:	resolution of headache  Instructions for follow-up, activity and diet:	Neurology consulted- repeat MRV performed. Neuro performed LP and was unsuccessful.   Follow up with neurology outpatient, call for appointment [phone]  Secondary Diagnosis:	Papilledema, both eyes  Secondary Diagnosis:	Photophobia, bilateral  Secondary Diagnosis:	11 weeks gestation of pregnancy Principal Discharge DX:	Other complicated headache syndrome  Goal:	resolution of headache  Instructions for follow-up, activity and diet:	Neurology consulted- repeat MRV performed. Neuro performed LP and was unsuccessful.   follow with Vascular Neurology as an outpatient at [address] [place] [address] [phone]  Secondary Diagnosis:	Papilledema, both eyes  Secondary Diagnosis:	Photophobia, bilateral  Secondary Diagnosis:	11 weeks gestation of pregnancy Principal Discharge DX:	Thrombosis cerebral vein  Goal:	continued anticoagulation  Instructions for follow-up, activity and diet:	Continue Lovenox injections as directed  Follow with Vascular Neurology as an outpatient at [address] [place] [address] [phone]  Secondary Diagnosis:	Papilledema, both eyes  Instructions for follow-up, activity and diet:	Follow up with ophthalmology  Secondary Diagnosis:	11 weeks gestation of pregnancy  Goal:	Continued prenatal care Principal Discharge DX:	Thrombosis cerebral vein  Goal:	continued anticoagulation  Instructions for follow-up, activity and diet:	Continue Lovenox injections as directed  Follow with Vascular Neurology as an outpatient at [address] [place] [address] [phone]  Secondary Diagnosis:	Papilledema, both eyes  Instructions for follow-up, activity and diet:	Follow up with ophthalmology  Secondary Diagnosis:	11 weeks gestation of pregnancy  Goal:	Continued prenatal care  Instructions for follow-up, activity and diet:	Follow up with OBGYN Principal Discharge DX:	Thrombosis cerebral vein  Goal:	continued anticoagulation  Instructions for follow-up, activity and diet:	Continue Lovenox injections as directed  Follow with Vascular Neurology as an outpatient at [address] [place] [address] [phone]  Secondary Diagnosis:	Papilledema, both eyes  Instructions for follow-up, activity and diet:	Follow up with ophthalmology  Secondary Diagnosis:	11 weeks gestation of pregnancy  Goal:	Continued prenatal care  Instructions for follow-up, activity and diet:	Follow up with OBGYN Principal Discharge DX:	Thrombosis cerebral vein  Goal:	continued anticoagulation  Instructions for follow-up, activity and diet:	Continue Lovenox injections as directed  Follow with Vascular Neurology as an outpatient at [address] [place] [address] [phone]  Secondary Diagnosis:	Papilledema, both eyes  Instructions for follow-up, activity and diet:	Follow up with ophthalmology  Secondary Diagnosis:	11 weeks gestation of pregnancy  Goal:	Continued prenatal care  Instructions for follow-up, activity and diet:	Follow up with OBGYN Principal Discharge DX:	Thrombosis cerebral vein  Goal:	continued anticoagulation  Instructions for follow-up, activity and diet:	Continue Lovenox injections as directed.   Follow with Vascular Neurology as an outpatient at [address] [place] [address] [phone].  Follow up with Dr. Mckeon outpatient within 2 weeks of discharge for further hypercoagulable workup.  Secondary Diagnosis:	Papilledema, both eyes  Instructions for follow-up, activity and diet:	Follow up with ophthalmology  Secondary Diagnosis:	11 weeks gestation of pregnancy  Goal:	Continued prenatal care  Instructions for follow-up, activity and diet:	Follow up with OBGYN within 1 week of discharge from hospital. Principal Discharge DX:	Thrombosis cerebral vein  Goal:	continued anticoagulation  Instructions for follow-up, activity and diet:	Continue Lovenox injections as directed.   Follow with Vascular Neurology as an outpatient at [address] [place] [address] [phone] for your symptoms of headache and blurred vision.  Please make appointment to see Dr. Mckeon of vascular medicine re: Lovenox as well  Secondary Diagnosis:	Papilledema, both eyes  Instructions for follow-up, activity and diet:	Follow up with ophthalmology  Secondary Diagnosis:	11 weeks gestation of pregnancy  Goal:	Continued prenatal care  Instructions for follow-up, activity and diet:	Follow up with OBGYN within 1 week of discharge from hospital. Principal Discharge DX:	Thrombosis cerebral vein  Goal:	continued anticoagulation  Instructions for follow-up, activity and diet:	Continue Lovenox injections as directed.   Follow with Vascular Neurology as an outpatient at [address] [place] [address] [phone] for your symptoms of headache and blurred vision.  Please make appointment to see Dr. Mckeon of vascular medicine re: Lovenox as well  Secondary Diagnosis:	Papilledema, both eyes  Instructions for follow-up, activity and diet:	Follow up with ophthalmology  Secondary Diagnosis:	11 weeks gestation of pregnancy  Goal:	Continued prenatal care  Instructions for follow-up, activity and diet:	Follow up with OBGYN within 1 week of discharge from hospital. Principal Discharge DX:	Thrombosis cerebral vein  Goal:	continued anticoagulation  Instructions for follow-up, activity and diet:	Continue Lovenox injections as directed.   Follow with Vascular Neurology as an outpatient at [address] [place] [address] [phone] for your symptoms of headache and blurred vision.  Please make appointment to see Dr. Mckeon of vascular medicine re: Lovenox as well  Secondary Diagnosis:	Papilledema, both eyes  Instructions for follow-up, activity and diet:	Follow up with ophthalmology  Secondary Diagnosis:	11 weeks gestation of pregnancy  Goal:	Continued prenatal care  Instructions for follow-up, activity and diet:	Follow up with OBGYN within 1 week of discharge from hospital. Principal Discharge DX:	Thrombosis cerebral vein  Goal:	continued anticoagulation  Instructions for follow-up, activity and diet:	Continue Lovenox injections as directed.   Follow with Vascular Neurology as an outpatient at [address] [place] [address] [phone] for your symptoms of headache and blurred vision.  Please make appointment to see Dr. Mckeon of vascular medicine re: Lovenox as well  Secondary Diagnosis:	Papilledema, both eyes  Instructions for follow-up, activity and diet:	Follow up with ophthalmology  Secondary Diagnosis:	11 weeks gestation of pregnancy  Goal:	Continued prenatal care  Instructions for follow-up, activity and diet:	Follow up with OBGYN within 1 week of discharge from hospital.

## 2017-06-29 NOTE — DISCHARGE NOTE ADULT - MEDICATION SUMMARY - MEDICATIONS TO STOP TAKING
I will STOP taking the medications listed below when I get home from the hospital:    Medrol Dosepak 4 mg oral tablet  --  by mouth    nitrofurantoin macrocrystals 100 mg oral capsule  -- 1 cap(s) by mouth every 12 hours x 7 days    doxycycline hyclate 100 mg oral tablet  -- 1 tab(s) by mouth 2 times a day  -- Avoid prolonged or excessive exposure to direct and/or artificial sunlight while taking this medication.  Do not take this drug if you are pregnant.  Finish all this medication unless otherwise directed by prescriber.  Medication should be taken with plenty of water.    Macrobid 100 mg oral capsule  -- 1 cap(s) by mouth 2 times a day  -- Finish all this medication unless otherwise directed by prescriber.  May discolor urine or feces.  Take with food or milk.    Cytotec 200 mcg oral tablet  -- 3 tab(s) by mouth once a day  -- Do not take this drug if you are pregnant.  It is very important that you take or use this exactly as directed.  Do not skip doses or discontinue unless directed by your doctor.  Take with food or milk.    Macrodantin 100 mg oral capsule  -- 1 cap(s) by mouth 2 times a day  -- Finish all this medication unless otherwise directed by prescriber.  May discolor urine or feces.  Take with food or milk.

## 2017-06-29 NOTE — DISCHARGE NOTE ADULT - HOSPITAL COURSE
27 year old female at 11 weeks gestation (), with PMH of Migraine headaches, sent to the ED by ophthalmologist secondary to Papilledema.  Seen and evaluated at bedside; NAD.  Lying supine in bed in the darkness secondary to worsening headaches with any light.  Patient relates that some weeks ago, she began to experience right sided headache associated with neck pain and rhinorrhea.     Hospital Course: 27 year old female at 11 weeks gestation (), with PMH of Migraine headaches, sent to the ED by ophthalmologist secondary to Papilledema.  Seen and evaluated at bedside; NAD.  Lying supine in bed in the darkness secondary to worsening headaches with any light.  Patient relates that some weeks ago, she began to experience right sided headache associated with neck pain and rhinorrhea. MRI head concerning for venous thrombosis. Patient started on Lovenox. risk vs benefit discussed and patient agreed to lovenox. Neurology recommend CT Venogram. Ct Venogram confirmed venous thrombosis. Patient received Lovenox 140 x 1 day then lovenox 100mg BID  Patient to follow up with vascular outpatient. 27 year old female at 11 weeks gestation (), with PMH of Migraine headaches, sent to the ED by ophthalmologist secondary to Papilledema.  Seen and evaluated at bedside; NAD.  Lying supine in bed in the darkness secondary to worsening headaches with any light.  Patient relates that some weeks ago, she began to experience right sided headache associated with neck pain and rhinorrhea. MRI head concerning for venous thrombosis. Patient started on Lovenox. risk vs benefit discussed and patient agreed to lovenox. Neurology recommend CT Venogram. Ct Venogram confirmed venous thrombosis. Patient received Lovenox 140 x 1 day then lovenox 100mg BID  Patient to follow up with vascular outpatient, cardiology Dr. rodríguez for further hypercoagulable workup. 27 year old female at 11 weeks gestation (), with PMH of Migraine headaches, sent to the ED by ophthalmologist secondary to Papilledema.  Seen and evaluated at bedside; NAD.  Lying supine in bed in the darkness secondary to worsening headaches with any light.  Patient relates that some weeks ago, she began to experience right sided headache associated with neck pain and rhinorrhea. MRI head concerning for venous thrombosis. Patient started on Lovenox. risk vs benefit discussed and patient agreed to lovenox. Neurology recommend CT Venogram. Ct Venogram confirmed venous thrombosis. Patient received Lovenox 140 x 1 day then lovenox 100mg BID.  Still symptomatic.  states her fiance and her sister will inject her as outpatient    Patient to follow up with vascular outpatient, cardiology Dr. rodríguez for further hypercoagulable workup. 27 year old female at 11 weeks gestation (), with PMH of Migraine headaches, sent to the ED by ophthalmologist secondary to Papilledema.  Seen and evaluated at bedside; NAD.  Lying supine in bed in the darkness secondary to worsening headaches with any light.  Patient relates that some weeks ago, she began to experience right sided headache associated with neck pain and rhinorrhea. MRI head concerning for venous thrombosis. Patient started on Lovenox. risk vs benefit discussed and patient agreed to lovenox. Neurology recommend CT Venogram. Ct Venogram confirmed venous thrombosis. Patient received Lovenox 140 x 1 day then lovenox 100mg BID.  Still symptomatic.  states her fiance and her sister will inject her as outpatient    Patient to follow up with vascular outpatient, cardiology Dr. rodríguez for further hypercoagulable workup.   also treated for UTI this adm- pt was diagnosed w/ UTI in  visit to ED- did not tolerate Macrobid- was switched to cipro- thus completed 3 day course here 27 year old female at 11 weeks gestation (), with PMH of Migraine headaches, sent to the ED by ophthalmologist secondary to Papilledema.  Seen and evaluated at bedside; NAD.  Lying supine in bed in the darkness secondary to worsening headaches with any light.  Patient relates that some weeks ago, she began to experience right sided headache associated with neck pain and rhinorrhea. MRI head concerning for venous thrombosis. Patient started on Lovenox. risk vs benefit discussed and patient agreed to lovenox. Neurology recommend CT Venogram. Ct Venogram confirmed venous thrombosis. Patient received Lovenox 140 x 1 day then lovenox 100mg BID.  Still symptomatic.  states her fiance and her sister will inject her as outpatient    Patient to follow up with vascular outpatient, cardiology Dr. rodríguez for further hypercoagulable workup.   also treated for UTI this adm- pt was diagnosed w/ UTI in  visit to ED- did not tolerate Macrobid- was switched to cipro- thus completed 3 day course here  time 40min

## 2017-06-29 NOTE — DISCHARGE NOTE ADULT - PLAN OF CARE
resolution of headache Neurology consulted- repeat MRV performed. Neuro performed LP___________________________________.  Follow up with neurology outpatient, call for appointment [phone] Neurology consulted- repeat MRV performed. Neuro performed LP and was unsuccessful.   Follow up with neurology outpatient, call for appointment [phone] Neurology consulted- repeat MRV performed. Neuro performed LP and was unsuccessful.   follow with Vascular Neurology as an outpatient at [address] [place] [address] - [phone] Follow up with ophthalmology Continued prenatal care continued anticoagulation Continue Lovenox injections as directed  Follow with Vascular Neurology as an outpatient at [address] [place] [address] [phone] Follow up with OBGYN Continue Lovenox injections as directed.   Follow with Vascular Neurology as an outpatient at [address] [place] [address] [phone].  Follow up with Dr. Mckeon outpatient within 2 weeks of discharge for further hypercoagulable workup. Follow up with OBGYN within 1 week of discharge from hospital. Continue Lovenox injections as directed.   Follow with Vascular Neurology as an outpatient at [address] [place] [address] [phone] for your symptoms of headache and blurred vision.  Please make appointment to see Dr. Mckeon of vascular medicine re: Lovenox as well

## 2017-06-29 NOTE — DISCHARGE NOTE ADULT - MEDICATION SUMMARY - MEDICATIONS TO TAKE
I will START or STAY ON the medications listed below when I get home from the hospital:    ibuprofen 600 mg oral tablet  -- 1 tab(s) by mouth every 8 hours, As Needed for pain  -- Indication: For Pain    enoxaparin 100 mg/mL injectable solution  -- 1 milligram(s) subcutaneously 2 times a day  -- It is very important that you take or use this exactly as directed.  Do not skip doses or discontinue unless directed by your doctor.    -- Indication: For Thrombosis cerebral vein    loratadine 10 mg oral tablet  -- 1 tab(s) by mouth once a day  -- Indication: For Allergies/itching    famotidine 20 mg oral tablet  -- 1 tab(s) by mouth 2 times a day  -- Indication: For Heartburn    Prenatal Multivitamins with Folic Acid 1 mg oral tablet  -- 1 tab(s) by mouth once a day  -- Indication: For Pregnancy

## 2017-06-29 NOTE — DISCHARGE NOTE ADULT - CARE PROVIDERS DIRECT ADDRESSES
[email or web address]

## 2017-06-29 NOTE — DISCHARGE NOTE ADULT - CARE PROVIDER_API CALL
Urmila Mckeon), Neurology  [address] [place], [place] [address]  Phone: [phone]  Fax: [phone] Urmila Mckeon), Neurology  [address] [place] [address]  Phone: [phone]  Fax: [phone]    Casa Mckeon (MD; PhD), Cardiology; Internal Medicine; Vascular Medicine  [address] [place] [address]  Phone: [phone]  Fax: [phone] Urmila Mckeon), Neurology  [address] [place] [address]  Phone: [phone]  Fax: [phone]    Casa Mckeon (MD; PhD), Cardiology; Internal Medicine; Vascular Medicine  [address] [place] [address]  Phone: [phone]  Fax: [phone]    Kohanim, Behnam (MD), Obstetrics and Gynecology  [address] [address]  Phone: [phone]  Fax: [phone]

## 2017-06-29 NOTE — H&P ADULT - FAMILY HISTORY
Grandparent  Still living? Unknown  Family history of hypertension, Age at diagnosis: Age Unknown  Family history of diabetes mellitus, Age at diagnosis: Age Unknown

## 2017-06-29 NOTE — H&P ADULT - PROBLEM SELECTOR PLAN 2
- associated with papilledema, constant headache x > 2 months  - being followed by neurology team (appreciated)  - avoid increased light - according to patient's comfort

## 2017-06-29 NOTE — H&P ADULT - ENMT COMMENTS
sinus symptoms weeks ago at onset of headache; resolved teeth evident (unable to examine mouth; patient indicates inability to tolerate light)

## 2017-06-29 NOTE — H&P ADULT - PROBLEM SELECTOR PLAN 4
- UC of 06/05/2017 with Klebsiella pneumonia  - initially treated with Macrobid and transitioned to Cipro - according to sensitivity  - PO intolerance 3 days ago and patient only resumed Cipro yesterday  - had burning with micturition prior to ED presentation  - Ciprofloxacin 400 mg IV Q12 H started  - patient has multiple drug allergies  - IVF hydration

## 2017-06-29 NOTE — PROGRESS NOTE ADULT - SUBJECTIVE AND OBJECTIVE BOX
Patient is a 27y old  Female who presents with a chief complaint of Severe headache with b/l papilledema      SUBJECTIVE / OVERNIGHT EVENTS: still with HA and blurry vision, s/p unsuccessful LP by neuro    MEDICATIONS  (STANDING):  sodium chloride 0.9%. 1000 milliLiter(s) (100 mL/Hr) IV Continuous <Continuous>  ciprofloxacin   IVPB 400 milliGRAM(s) IV Intermittent every 12 hours    MEDICATIONS  (PRN):      Vital Signs Last 24 Hrs  T(C): 36.7 (06-29-17 @ 14:31), Max: 36.9 (06-28-17 @ 16:14)  HR: 78 (06-29-17 @ 14:31) (60 - 93)  BP: 122/77 (06-29-17 @ 14:31) (95/51 - 152/95)  RR: 17 (06-29-17 @ 14:31) (16 - 18)  SpO2: 100% (06-29-17 @ 14:31) (95% - 100%)  CAPILLARY BLOOD GLUCOSE        PHYSICAL EXAM:  GENERAL: NAD, well-developed  HEAD:  Atraumatic, Normocephalic  EYES: EOMI, PERRLA, conjunctiva and sclera clear  CHEST/LUNG: Clear to auscultation bilaterally; No wheeze  HEART: Regular rate and rhythm; No murmurs, rubs, or gallops  ABDOMEN: Soft, Nontender, Nondistended; Bowel sounds present  EXTREMITIES:  No clubbing, cyanosis, or edema  PSYCH: AAOx3  NEUROLOGY: HA, blurred vision      LABS:                        11.7   9.66  )-----------( 239      ( 28 Jun 2017 18:40 )             36.2     06-29    136  |  101  |  8   ----------------------------<  72  4.3   |  25  |  0.61    Ca    8.9      29 Jun 2017 06:00  Phos  3.5     06-29  Mg     1.7     06-29

## 2017-06-29 NOTE — DISCHARGE NOTE ADULT - PROVIDER TOKENS
TOKEN:'[id number]' TOKEN:'[id number]',TOKEN:'[id number]' TOKEN:'[id number]',TOKEN:'[id number]',TOKEN:'[id number]'

## 2017-06-30 PROCEDURE — 99233 SBSQ HOSP IP/OBS HIGH 50: CPT

## 2017-06-30 PROCEDURE — 99232 SBSQ HOSP IP/OBS MODERATE 35: CPT

## 2017-06-30 RX ADMIN — Medication 200 MILLIGRAM(S): at 18:11

## 2017-06-30 RX ADMIN — Medication 200 MILLIGRAM(S): at 06:02

## 2017-06-30 NOTE — PROGRESS NOTE ADULT - ASSESSMENT
26yo female  at 11 weeks gestation p/w UTI, papilledema, HA, blurry vision, suspecting benign intracranial HTN, s/p unsuccessful LP attempts.

## 2017-06-30 NOTE — PROGRESS NOTE ADULT - PROBLEM SELECTOR PLAN 1
d/w neuo. would treat empirically with Diamox. will have OB eval and input ion this epimeric treatment prior to initiation.  with back and hip pain post LP attempt today. will monitor today for ambulation and likely d.c planning for neel after OB input.

## 2017-06-30 NOTE — PROGRESS NOTE ADULT - SUBJECTIVE AND OBJECTIVE BOX
Subjective:Interval History - No events overnight. Has back pain from failed LP yesterday    Objective:   Vital Signs Last 24 Hrs  T(C): 37 (30 Jun 2017 06:00), Max: 37.2 (30 Jun 2017 01:50)  T(F): 98.6 (30 Jun 2017 06:00), Max: 98.9 (30 Jun 2017 01:50)  HR: 88 (30 Jun 2017 06:00) (74 - 91)  BP: 108/65 (30 Jun 2017 06:00) (103/55 - 123/80)  BP(mean): --  RR: 16 (30 Jun 2017 06:00) (16 - 18)  SpO2: 100% (30 Jun 2017 06:00) (99% - 188%)    General Exam:   General appearance: No acute distress                   Neurological Exam:  Mental Status: Orientated to self, date and place.  Attention intact.  No dysarthria, aphasia or neglect.  Knowledge intact.  Registration intact.  Short and long term memory grossly intact.      Cranial Nerves: CN I - not tested.  PERRL, EOMI, VFF, no nystagmus or diplopia.  No APD.  Fundi not visualized bilaterally.  CN V1-3 intact to light touch and pinprick.  No facial asymmetry.  Hearing intact to finger rub bilaterally.  Tongue, uvula and palate midline.  Sternocleidomastoid and Trapezius intact bilaterally.    Motor:   Tone: normal.                  Strength: intact throughout  Pronator drift: none                 Dysmeria: None to finger-nose-finger or heel-shin-heel  No truncal ataxia.    Tremor: No resting, postural or action tremor.  No myoclonus.    Sensation: intact to light touch, pinprick, vibration and proprioception    Deep Tendon Reflexes: 1+ bilateral biceps, triceps, brachioradialis, knee and ankle  Toes flexor bilaterally    Gait: normal and stable.      Other:    06-29    136  |  101  |  8   ----------------------------<  72  4.3   |  25  |  0.61    Ca    8.9      29 Jun 2017 06:00  Phos  3.5     06-29  Mg     1.7     06-29    TPro  7.5  /  Alb  3.8  /  TBili  < 0.2<L>  /  DBili  x   /  AST  12  /  ALT  14  /  AlkPhos  50  06-28    LIVER FUNCTIONS - ( 28 Jun 2017 18:40 )  Alb: 3.8 g/dL / Pro: 7.5 g/dL / ALK PHOS: 50 u/L / ALT: 14 u/L / AST: 12 u/L / GGT: x                                 11.7   9.66  )-----------( 239      ( 28 Jun 2017 18:40 )             36.2       MEDICATIONS  (STANDING):  sodium chloride 0.9%. 1000 milliLiter(s) (100 mL/Hr) IV Continuous <Continuous>  ciprofloxacin   IVPB 400 milliGRAM(s) IV Intermittent every 12 hours  oxyCODONE  5 mG/acetaminophen 325 mG 1 Tablet(s) Oral once    MEDICATIONS  (PRN): Subjective:Interval History - No events overnight. Has back soreness from failed LP yesterday    Objective:   Vital Signs Last 24 Hrs  T(C): 37 (30 Jun 2017 06:00), Max: 37.2 (30 Jun 2017 01:50)  T(F): 98.6 (30 Jun 2017 06:00), Max: 98.9 (30 Jun 2017 01:50)  HR: 88 (30 Jun 2017 06:00) (74 - 91)  BP: 108/65 (30 Jun 2017 06:00) (103/55 - 123/80)  BP(mean): --  RR: 16 (30 Jun 2017 06:00) (16 - 18)  SpO2: 100% (30 Jun 2017 06:00) (99% - 188%)    General Exam:   General appearance: No acute distress                   Neurological Exam:  Mental Status: Orientated to self, date and place.  Attention intact.  No dysarthria, aphasia or neglect.  Knowledge intact.  Registration intact.  Short and long term memory grossly intact.      Cranial Nerves: CN I - not tested.  PERRL, EOMI, VFF, no nystagmus or diplopia.  No APD.  Fundi not visualized bilaterally.  CN V1-3 intact to light touch and pinprick.  No facial asymmetry.  Hearing intact to finger rub bilaterally.  Tongue, uvula and palate midline.  Sternocleidomastoid and Trapezius intact bilaterally.    Motor:   Tone: normal.                  Strength: intact throughout  Pronator drift: none                 Dysmeria: None to finger-nose-finger or heel-shin-heel  No truncal ataxia.    Tremor: No resting, postural or action tremor.  No myoclonus.    Sensation: intact to light touch, pinprick, vibration and proprioception    Deep Tendon Reflexes: 1+ bilateral biceps, triceps, brachioradialis, knee and ankle  Toes flexor bilaterally    Gait: normal and stable.      Other:    06-29    136  |  101  |  8   ----------------------------<  72  4.3   |  25  |  0.61    Ca    8.9      29 Jun 2017 06:00  Phos  3.5     06-29  Mg     1.7     06-29    TPro  7.5  /  Alb  3.8  /  TBili  < 0.2<L>  /  DBili  x   /  AST  12  /  ALT  14  /  AlkPhos  50  06-28    LIVER FUNCTIONS - ( 28 Jun 2017 18:40 )  Alb: 3.8 g/dL / Pro: 7.5 g/dL / ALK PHOS: 50 u/L / ALT: 14 u/L / AST: 12 u/L / GGT: x                                 11.7   9.66  )-----------( 239      ( 28 Jun 2017 18:40 )             36.2       MEDICATIONS  (STANDING):  sodium chloride 0.9%. 1000 milliLiter(s) (100 mL/Hr) IV Continuous <Continuous>  ciprofloxacin   IVPB 400 milliGRAM(s) IV Intermittent every 12 hours  oxyCODONE  5 mG/acetaminophen 325 mG 1 Tablet(s) Oral once    MEDICATIONS  (PRN):

## 2017-06-30 NOTE — PROGRESS NOTE ADULT - ASSESSMENT
26 yo pregnanat with progressive headache for one month and papiledema 28 yo pregnanat with progressive headache for one month and papilledema. differential includes benign intracranial hypertension vs migraine exacerbation.

## 2017-06-30 NOTE — PROCEDURE NOTE - NSPOSTCAREGUIDE_GEN_A_CORE
Verbal/written post procedure instructions were given to patient/caregiver
Verbal/written post procedure instructions were given to patient/caregiver

## 2017-06-30 NOTE — PROCEDURE NOTE - NSPROCDETAILS_GEN_ALL_CORE
location identified, draped/prepped, sterile technique used, needle inserted/introduced/procedure aborted
area cleaned in sterile fashion/location identified, draped/prepped, sterile technique used, needle inserted/introduced

## 2017-06-30 NOTE — PROGRESS NOTE ADULT - SUBJECTIVE AND OBJECTIVE BOX
Patient is a 27y old  Female who presents with a chief complaint of Severe headache (29 Jun 2017 10:29)      SUBJECTIVE / OVERNIGHT EVENTS:    MEDICATIONS  (STANDING):  sodium chloride 0.9%. 1000 milliLiter(s) (100 mL/Hr) IV Continuous <Continuous>  ciprofloxacin   IVPB 400 milliGRAM(s) IV Intermittent every 12 hours  oxyCODONE  5 mG/acetaminophen 325 mG 1 Tablet(s) Oral once    MEDICATIONS  (PRN):      T(C): 36.7 (06-30-17 @ 10:15)  HR: 100 (06-30-17 @ 10:15)  BP: 112/76 (06-30-17 @ 10:15)  RR: 17 (06-30-17 @ 10:15)  SpO2: 100% (06-30-17 @ 10:15)  CAPILLARY BLOOD GLUCOSE        I&O's Summary      PHYSICAL EXAM:  GENERAL: NAD, well-developed, AOx3  HEAD:  Atraumatic, Normocephalic  EYES: EOMI, PERRL, conjunctiva and sclera clear  NECK: Supple, No JVD  CHEST/LUNG: Clear to auscultation bilaterally  HEART: Regular rate and rhythm; No murmurs, rubs, or gallops, No Edema  ABDOMEN: Soft, Nontender, Nondistended; Bowel sounds present  EXTREMITIES:  2+ Peripheral Pulses, No clubbing, cyanosis  PSYCH: No SI/HI  NEUROLOGY: non-focal  SKIN: No rashes or lesions    LABS:                        11.7   9.66  )-----------( 239      ( 28 Jun 2017 18:40 )             36.2     06-29    136  |  101  |  8   ----------------------------<  72  4.3   |  25  |  0.61    Ca    8.9      29 Jun 2017 06:00  Phos  3.5     06-29  Mg     1.7     06-29    TPro  7.5  /  Alb  3.8  /  TBili  < 0.2<L>  /  DBili  x   /  AST  12  /  ALT  14  /  AlkPhos  50  06-28    PT/INR - ( 28 Jun 2017 18:40 )   PT: 11.3 SEC;   INR: 1.01          PTT - ( 28 Jun 2017 18:40 )  PTT:29.7 SEC              RADIOLOGY & ADDITIONAL TESTS:    Imaging Personally Reviewed:    Consultant(s) Notes Reviewed:      Care Discussed with Consultants/Other Providers:

## 2017-06-30 NOTE — PROGRESS NOTE ADULT - PROBLEM SELECTOR PLAN 1
Will reattempt LP today LP failed x 2  start Diamox 250mg bid for 7 days as IIH Prophylaxis then in 1 week switch to 500mg bid  F/u Dr. Renita chiu as an outpatient [phone]

## 2017-07-01 DIAGNOSIS — G43.109 MIGRAINE WITH AURA, NOT INTRACTABLE, WITHOUT STATUS MIGRAINOSUS: ICD-10-CM

## 2017-07-01 LAB
BASOPHILS # BLD AUTO: 0.02 K/UL — SIGNIFICANT CHANGE UP (ref 0–0.2)
BASOPHILS NFR BLD AUTO: 0.3 % — SIGNIFICANT CHANGE UP (ref 0–2)
EOSINOPHIL # BLD AUTO: 0.13 K/UL — SIGNIFICANT CHANGE UP (ref 0–0.5)
EOSINOPHIL NFR BLD AUTO: 1.9 % — SIGNIFICANT CHANGE UP (ref 0–6)
HCT VFR BLD CALC: 35 % — SIGNIFICANT CHANGE UP (ref 34.5–45)
HGB BLD-MCNC: 11.2 G/DL — LOW (ref 11.5–15.5)
IMM GRANULOCYTES # BLD AUTO: 0.04 # — SIGNIFICANT CHANGE UP
IMM GRANULOCYTES NFR BLD AUTO: 0.6 % — SIGNIFICANT CHANGE UP (ref 0–1.5)
LYMPHOCYTES # BLD AUTO: 1.63 K/UL — SIGNIFICANT CHANGE UP (ref 1–3.3)
LYMPHOCYTES # BLD AUTO: 23.6 % — SIGNIFICANT CHANGE UP (ref 13–44)
MCHC RBC-ENTMCNC: 26.2 PG — LOW (ref 27–34)
MCHC RBC-ENTMCNC: 32 % — SIGNIFICANT CHANGE UP (ref 32–36)
MCV RBC AUTO: 82 FL — SIGNIFICANT CHANGE UP (ref 80–100)
MONOCYTES # BLD AUTO: 0.81 K/UL — SIGNIFICANT CHANGE UP (ref 0–0.9)
MONOCYTES NFR BLD AUTO: 11.7 % — SIGNIFICANT CHANGE UP (ref 2–14)
NEUTROPHILS # BLD AUTO: 4.29 K/UL — SIGNIFICANT CHANGE UP (ref 1.8–7.4)
NEUTROPHILS NFR BLD AUTO: 61.9 % — SIGNIFICANT CHANGE UP (ref 43–77)
NRBC # FLD: 0 — SIGNIFICANT CHANGE UP
PLATELET # BLD AUTO: 205 K/UL — SIGNIFICANT CHANGE UP (ref 150–400)
PMV BLD: 9.9 FL — SIGNIFICANT CHANGE UP (ref 7–13)
RBC # BLD: 4.27 M/UL — SIGNIFICANT CHANGE UP (ref 3.8–5.2)
RBC # FLD: 16.1 % — HIGH (ref 10.3–14.5)
VIT A SERPL-MCNC: 34 UG/DL — SIGNIFICANT CHANGE UP (ref 20–65)
WBC # BLD: 6.92 K/UL — SIGNIFICANT CHANGE UP (ref 3.8–10.5)
WBC # FLD AUTO: 6.92 K/UL — SIGNIFICANT CHANGE UP (ref 3.8–10.5)

## 2017-07-01 PROCEDURE — 99233 SBSQ HOSP IP/OBS HIGH 50: CPT

## 2017-07-01 RX ORDER — SODIUM CHLORIDE 9 MG/ML
1000 INJECTION INTRAMUSCULAR; INTRAVENOUS; SUBCUTANEOUS
Qty: 0 | Refills: 0 | Status: DISCONTINUED | OUTPATIENT
Start: 2017-07-01 | End: 2017-07-06

## 2017-07-01 RX ADMIN — Medication 200 MILLIGRAM(S): at 06:54

## 2017-07-01 RX ADMIN — SODIUM CHLORIDE 100 MILLILITER(S): 9 INJECTION INTRAMUSCULAR; INTRAVENOUS; SUBCUTANEOUS at 18:57

## 2017-07-01 RX ADMIN — Medication 200 MILLIGRAM(S): at 18:57

## 2017-07-01 NOTE — PROGRESS NOTE ADULT - ASSESSMENT
26yo female  at 11 weeks gestation p/w UTI, papilledema, HA, blurry vision, suspecting benign intracranial HTN, s/p unsuccessful LP attempts. 28yo female  at 11 weeks gestation p/w UTI, papilledema, HA, blurry vision, suspecting benign intracranial HTN vs Thrombus s/p multiple unsuccessful LP attempts.

## 2017-07-01 NOTE — CONSULT NOTE ADULT - ASSESSMENT
Patient was seen and counseled with Dr Fleischer.  24 yo  2 11 weeks who is admitted under medicine service for severe headache and papilledema, possibly secondary to benign intracranial hypertension. The plan is to start Diamox  We discussed that Diamox is a Cat C medication. The risks and benefits of medication in early period of organogenesis were discussed. We discussed that the risks of the medication should be weighed against the benefits to the mother. If the medication was being used for symptomatic relief, it would be safest to delay till 13 weeks, when organogenesis is complete.  We also discussed that it would be important to rule out other causes of headache, including thrombosis  Imaging should be reviewed by a Neuroradiologist. If there is any thrombus, there is no contraindication to therapeutic anticoagulation

## 2017-07-01 NOTE — CONSULT NOTE ADULT - SUBJECTIVE AND OBJECTIVE BOX
GYN Consult Note    27y  11+2wk by dates and first trimester sono, w/ PMH migraines, sent to the ED by ophthalmologist secondary to Papilledema.  Pt reports headache that began 2 mo prior, now constant in nature. Pt reports pain is R sided with associated n/v, photophobia and phonophobia and blurry vision. Pt denies weakness, numbness in extremities or face. Pt denies vaginal bleeding, discharge, abdominal pain, cramping. Pt has not been taking meds for headache, as she is allergic to tylenol and cannot take NSAIDs. Pt otherwise denies CP, SOB, fevers, chills, changes in GI/ fxn.       OB/GYN HISTORY:   G1: SABx1 2016  H/o ovarian cysts     PAST MEDICAL & SURGICAL HISTORY:  Ophthalmalgia, bilateral  Photophobia, bilateral  11 weeks gestation of pregnancy  Migraines  No significant past surgical history      REVIEW OF SYSTEMS    General: denies fevers, chills, tiredness    Skin/Breast: denies breast pain  	  Respiratory and Thorax: denies shortness of breath, denies cough  	  Cardiovascular: denies chest pain	 and denies palpitations    Gastrointestinal: reports n/v in AM, denies abdominal pain    Genitourinary: denies dysuria, increased urinary frequency, urgency	    Constitutional, Cardiovascular, Respiratory, Gastrointestinal, Genitourinary, Musculoskeletal and Integumentary review of systems are normal except as noted. 	    MEDICATIONS  (STANDING):  sodium chloride 0.9%. 1000 milliLiter(s) (100 mL/Hr) IV Continuous <Continuous>  ciprofloxacin   IVPB 400 milliGRAM(s) IV Intermittent every 12 hours  oxyCODONE  5 mG/acetaminophen 325 mG 1 Tablet(s) Oral once    MEDICATIONS  (PRN):      Allergies    Benadryl (Hives)  cortisone (Hives)  pcn (Unknown)  Tylenol (Hives)  Tylenol (Urticaria)  Zofran (Anaphylaxis)  Zofran (Rash)          SOCIAL HISTORY: denies x3    FAMILY HISTORY:  Family history of diabetes mellitus (Grandparent)  Family history of hypertension (Grandparent): grandmother      Vital Signs Last 24 Hrs  T(C): 36.8 (2017 13:46), Max: 36.9 (2017 18:01)  T(F): 98.2 (2017 13:46), Max: 98.4 (2017 18:01)  HR: 71 (2017 13:46) (62 - 90)  BP: 116/70 (2017 13:46) (110/72 - 118/76)  BP(mean): --  RR: 18 (2017 13:46) (17 - 18)  SpO2: 100% (2017 13:46) (99% - 100%)    PHYSICAL EXAM:      Gen: NAD, alert and oriented x 3    Cardiovascular: regular rate and rhythm, S1 and S2 present     Respiratory: CTAB    Abd: soft, non tender, non-distended, + bowel sounds.     Extremities: NTBL    Skin: warm and well perfused  Neuro: EOMI, PERRLA, motor 2+ in all ext, no numbness    LABS:                        11.2   6.92  )-----------( 205      ( 2017 09:30 )             35.0         RADIOLOGY & ADDITIONAL STUDIES:    MRV: Decreased flow enhancement within the right sigmoid sinus   when compared with the prior exam. While this may be artifactual   secondary to phase contrast technique due to slow or turbulent flow, the   possibility of developing thrombus is not excluded.    Otherwise stable exam including decreased flow enhancement at the   junction of the transverse and sigmoid sinuses compatible with sinus   stenosis.

## 2017-07-01 NOTE — CHART NOTE - NSCHARTNOTEFT_GEN_A_CORE
GYN recommend r/o thrombosis as possible cause of headache, MRA Venogram done 6/29, unable to r/o thrombosis, Neurology recommend repeat of MRA venogram after adequate IV hydration to dilate vessels and maximize visual.

## 2017-07-01 NOTE — CONSULT NOTE ADULT - ATTENDING COMMENTS
I evaluated patient at bedside. Agree with the note above.   Patient still has a headache with some neck pain at times. Cold compress to the neck relieves pain somewhat. Pain is different from headaches that patient had in past and not going away.  Neurology and MFM input appreciated.  Patient is obstetrically stable.   Continue treatment for UTI in pregnancy with antibiotic.  Agree with recommendation to continue evaluation of the etiology of the headache (r/o thrombosis)  with subsequent treatment and symptom management.

## 2017-07-01 NOTE — PROGRESS NOTE ADULT - PROBLEM SELECTOR PLAN 1
MRV with low flow, can not r.o thrombus.   appreciate OB. would hold off on Dimaox if possible until 13th week.  d/w neuro. unable to do CT therefore MRI is the best imaging available. will follow up with neuro on decision for further w/u or treatment for possible thrombus. MRV with low flow, can not r.o thrombus.   appreciate OB. would hold off on Dimaox if possible until 13th weeks of gestation is patient able to tolerate symptoms.  d/w neuro. unable to do CT for further delineation of flow and thrombus evaluation therefore MRI is the best imaging available. will follow up with neuro on decision for further w/u or treatment for possible thrombus.

## 2017-07-01 NOTE — CONSULT NOTE ADULT - PROBLEM SELECTOR RECOMMENDATION 9
-MFM consult  -do not recommend discharge  -bedside ultrasound to be done by MFM  -recommend review of imaging by neuroradiologists to r/o thrombosis -MFM consult  -do not recommend discharge  -will defer to MFM concerning Diomax for headache  -bedside ultrasound to be done by M  -recommend review of imaging by neuroradiologists to r/o thrombosis

## 2017-07-01 NOTE — PROGRESS NOTE ADULT - SUBJECTIVE AND OBJECTIVE BOX
Patient is a 27y old  Female who presents with a chief complaint of Severe headache (29 Jun 2017 10:29)      SUBJECTIVE / OVERNIGHT EVENTS: no change in symptoms, still with HA, Photophobia, blurry vision.     MEDICATIONS  (STANDING):  sodium chloride 0.9%. 1000 milliLiter(s) (100 mL/Hr) IV Continuous <Continuous>  ciprofloxacin   IVPB 400 milliGRAM(s) IV Intermittent every 12 hours  oxyCODONE  5 mG/acetaminophen 325 mG 1 Tablet(s) Oral once    MEDICATIONS  (PRN):      T(C): 36.8 (07-01-17 @ 13:46)  HR: 71 (07-01-17 @ 13:46)  BP: 116/70 (07-01-17 @ 13:46)  RR: 18 (07-01-17 @ 13:46)  SpO2: 100% (07-01-17 @ 13:46)  CAPILLARY BLOOD GLUCOSE        I&O's Summary    30 Jun 2017 07:01  -  01 Jul 2017 07:00  --------------------------------------------------------  IN: 920 mL / OUT: 0 mL / NET: 920 mL        PHYSICAL EXAM:  GENERAL: NAD, well-developed, AOx3  HEAD:  Atraumatic, Normocephalic  EYES: EOMI, PERRL, conjunctiva and sclera clear  NECK: Supple, No JVD  CHEST/LUNG: Clear to auscultation bilaterally  HEART: Regular rate and rhythm; No murmurs, rubs, or gallops, No Edema  ABDOMEN: Soft, Nontender, Nondistended; Bowel sounds present  EXTREMITIES:  2+ Peripheral Pulses, No clubbing, cyanosis  PSYCH: No SI/HI  NEUROLOGY: non-focal  SKIN: No rashes or lesions    LABS:                        11.2   6.92  )-----------( 205      ( 01 Jul 2017 09:30 )             35.0                         RADIOLOGY & ADDITIONAL TESTS:    Imaging Personally Reviewed:    Consultant(s) Notes Reviewed:      Care Discussed with Consultants/Other Providers:

## 2017-07-02 LAB
BASOPHILS # BLD AUTO: 0.02 K/UL — SIGNIFICANT CHANGE UP (ref 0–0.2)
BASOPHILS NFR BLD AUTO: 0.2 % — SIGNIFICANT CHANGE UP (ref 0–2)
EOSINOPHIL # BLD AUTO: 0.17 K/UL — SIGNIFICANT CHANGE UP (ref 0–0.5)
EOSINOPHIL NFR BLD AUTO: 2.1 % — SIGNIFICANT CHANGE UP (ref 0–6)
HCT VFR BLD CALC: 33.8 % — LOW (ref 34.5–45)
HGB BLD-MCNC: 11.3 G/DL — LOW (ref 11.5–15.5)
IMM GRANULOCYTES # BLD AUTO: 0.04 # — SIGNIFICANT CHANGE UP
IMM GRANULOCYTES NFR BLD AUTO: 0.5 % — SIGNIFICANT CHANGE UP (ref 0–1.5)
LYMPHOCYTES # BLD AUTO: 1.9 K/UL — SIGNIFICANT CHANGE UP (ref 1–3.3)
LYMPHOCYTES # BLD AUTO: 23.6 % — SIGNIFICANT CHANGE UP (ref 13–44)
MCHC RBC-ENTMCNC: 27.3 PG — SIGNIFICANT CHANGE UP (ref 27–34)
MCHC RBC-ENTMCNC: 33.4 % — SIGNIFICANT CHANGE UP (ref 32–36)
MCV RBC AUTO: 81.6 FL — SIGNIFICANT CHANGE UP (ref 80–100)
MONOCYTES # BLD AUTO: 0.74 K/UL — SIGNIFICANT CHANGE UP (ref 0–0.9)
MONOCYTES NFR BLD AUTO: 9.2 % — SIGNIFICANT CHANGE UP (ref 2–14)
NEUTROPHILS # BLD AUTO: 5.17 K/UL — SIGNIFICANT CHANGE UP (ref 1.8–7.4)
NEUTROPHILS NFR BLD AUTO: 64.4 % — SIGNIFICANT CHANGE UP (ref 43–77)
NRBC # FLD: 0 — SIGNIFICANT CHANGE UP
PLATELET # BLD AUTO: 224 K/UL — SIGNIFICANT CHANGE UP (ref 150–400)
PMV BLD: 9.8 FL — SIGNIFICANT CHANGE UP (ref 7–13)
RBC # BLD: 4.14 M/UL — SIGNIFICANT CHANGE UP (ref 3.8–5.2)
RBC # FLD: 15.9 % — HIGH (ref 10.3–14.5)
WBC # BLD: 8.04 K/UL — SIGNIFICANT CHANGE UP (ref 3.8–10.5)
WBC # FLD AUTO: 8.04 K/UL — SIGNIFICANT CHANGE UP (ref 3.8–10.5)

## 2017-07-02 PROCEDURE — 70544 MR ANGIOGRAPHY HEAD W/O DYE: CPT | Mod: 26

## 2017-07-02 PROCEDURE — 99233 SBSQ HOSP IP/OBS HIGH 50: CPT

## 2017-07-02 RX ADMIN — Medication 200 MILLIGRAM(S): at 09:32

## 2017-07-02 RX ADMIN — SODIUM CHLORIDE 100 MILLILITER(S): 9 INJECTION INTRAMUSCULAR; INTRAVENOUS; SUBCUTANEOUS at 09:37

## 2017-07-02 NOTE — PROGRESS NOTE ADULT - ASSESSMENT
26yo female  at 11 weeks gestation p/w UTI, papilledema, HA, blurry vision, suspecting benign intracranial HTN vs Thrombus s/p multiple unsuccessful LP attempts.

## 2017-07-02 NOTE — PROGRESS NOTE ADULT - PROBLEM SELECTOR PLAN 3
sensitive kleb UTI. on Cipro, to complete 3 day course.
sensitive kleb UTI. s/p 3 days of abx. dc today.
cipro for 5 days
sensitive kleb UTI. on Cipro, to complete 5 day course.

## 2017-07-02 NOTE — PROGRESS NOTE ADULT - PROBLEM SELECTOR PROBLEM 3
Urinary tract infection during pregnancy, first trimester

## 2017-07-02 NOTE — PROGRESS NOTE ADULT - SUBJECTIVE AND OBJECTIVE BOX
Patient is a 27y old  Female who presents with a chief complaint of Severe headache (29 Jun 2017 10:29)      SUBJECTIVE / OVERNIGHT EVENTS: no change in symptoms     MEDICATIONS  (STANDING):  oxyCODONE  5 mG/acetaminophen 325 mG 1 Tablet(s) Oral once  sodium chloride 0.9%. 1000 milliLiter(s) (100 mL/Hr) IV Continuous <Continuous>    MEDICATIONS  (PRN):      T(C): 36.7 (07-02-17 @ 09:57)  HR: 79 (07-02-17 @ 09:57)  BP: 117/68 (07-02-17 @ 09:57)  RR: 18 (07-02-17 @ 09:57)  SpO2: 100% (07-02-17 @ 09:57)  CAPILLARY BLOOD GLUCOSE        I&O's Summary    01 Jul 2017 07:01  -  02 Jul 2017 07:00  --------------------------------------------------------  IN: 720 mL / OUT: 0 mL / NET: 720 mL        PHYSICAL EXAM:  GENERAL: NAD, well-developed, AOx3  HEAD:  Atraumatic, Normocephalic  EYES: EOMI, PERRL, conjunctiva and sclera clear  NECK: Supple, No JVD  CHEST/LUNG: Clear to auscultation bilaterally  HEART: Regular rate and rhythm; No murmurs, rubs, or gallops, No Edema  ABDOMEN: Soft, Nontender, Nondistended; Bowel sounds present  EXTREMITIES:  2+ Peripheral Pulses, No clubbing, cyanosis  PSYCH: No SI/HI  NEUROLOGY: non-focal  SKIN: No rashes or lesions    LABS:                        11.3   8.04  )-----------( 224      ( 02 Jul 2017 07:52 )             33.8                         RADIOLOGY & ADDITIONAL TESTS:    Imaging Personally Reviewed:    Consultant(s) Notes Reviewed:      Care Discussed with Consultants/Other Providers:

## 2017-07-03 DIAGNOSIS — G93.2 BENIGN INTRACRANIAL HYPERTENSION: ICD-10-CM

## 2017-07-03 LAB
BASOPHILS # BLD AUTO: 0.01 K/UL — SIGNIFICANT CHANGE UP (ref 0–0.2)
BASOPHILS NFR BLD AUTO: 0.2 % — SIGNIFICANT CHANGE UP (ref 0–2)
EOSINOPHIL # BLD AUTO: 0.16 K/UL — SIGNIFICANT CHANGE UP (ref 0–0.5)
EOSINOPHIL NFR BLD AUTO: 2.5 % — SIGNIFICANT CHANGE UP (ref 0–6)
HCT VFR BLD CALC: 32.7 % — LOW (ref 34.5–45)
HGB BLD-MCNC: 10.8 G/DL — LOW (ref 11.5–15.5)
IMM GRANULOCYTES # BLD AUTO: 0.04 # — SIGNIFICANT CHANGE UP
IMM GRANULOCYTES NFR BLD AUTO: 0.6 % — SIGNIFICANT CHANGE UP (ref 0–1.5)
LYMPHOCYTES # BLD AUTO: 1.48 K/UL — SIGNIFICANT CHANGE UP (ref 1–3.3)
LYMPHOCYTES # BLD AUTO: 23.2 % — SIGNIFICANT CHANGE UP (ref 13–44)
MCHC RBC-ENTMCNC: 26.6 PG — LOW (ref 27–34)
MCHC RBC-ENTMCNC: 33 % — SIGNIFICANT CHANGE UP (ref 32–36)
MCV RBC AUTO: 80.5 FL — SIGNIFICANT CHANGE UP (ref 80–100)
MONOCYTES # BLD AUTO: 0.42 K/UL — SIGNIFICANT CHANGE UP (ref 0–0.9)
MONOCYTES NFR BLD AUTO: 6.6 % — SIGNIFICANT CHANGE UP (ref 2–14)
NEUTROPHILS # BLD AUTO: 4.27 K/UL — SIGNIFICANT CHANGE UP (ref 1.8–7.4)
NEUTROPHILS NFR BLD AUTO: 66.9 % — SIGNIFICANT CHANGE UP (ref 43–77)
NRBC # FLD: 0 — SIGNIFICANT CHANGE UP
PLATELET # BLD AUTO: 201 K/UL — SIGNIFICANT CHANGE UP (ref 150–400)
PMV BLD: 10.1 FL — SIGNIFICANT CHANGE UP (ref 7–13)
RBC # BLD: 4.06 M/UL — SIGNIFICANT CHANGE UP (ref 3.8–5.2)
RBC # FLD: 15.9 % — HIGH (ref 10.3–14.5)
WBC # BLD: 6.38 K/UL — SIGNIFICANT CHANGE UP (ref 3.8–10.5)
WBC # FLD AUTO: 6.38 K/UL — SIGNIFICANT CHANGE UP (ref 3.8–10.5)

## 2017-07-03 PROCEDURE — 99233 SBSQ HOSP IP/OBS HIGH 50: CPT

## 2017-07-03 RX ORDER — ENOXAPARIN SODIUM 100 MG/ML
140 INJECTION SUBCUTANEOUS DAILY
Qty: 0 | Refills: 0 | Status: DISCONTINUED | OUTPATIENT
Start: 2017-07-03 | End: 2017-07-05

## 2017-07-03 RX ORDER — SODIUM CHLORIDE 9 MG/ML
1000 INJECTION INTRAMUSCULAR; INTRAVENOUS; SUBCUTANEOUS
Qty: 0 | Refills: 0 | Status: DISCONTINUED | OUTPATIENT
Start: 2017-07-03 | End: 2017-07-07

## 2017-07-03 RX ADMIN — ENOXAPARIN SODIUM 140 MILLIGRAM(S): 100 INJECTION SUBCUTANEOUS at 18:23

## 2017-07-03 RX ADMIN — SODIUM CHLORIDE 100 MILLILITER(S): 9 INJECTION INTRAMUSCULAR; INTRAVENOUS; SUBCUTANEOUS at 00:24

## 2017-07-03 RX ADMIN — SODIUM CHLORIDE 100 MILLILITER(S): 9 INJECTION INTRAMUSCULAR; INTRAVENOUS; SUBCUTANEOUS at 16:23

## 2017-07-03 NOTE — CHART NOTE - NSCHARTNOTEFT_GEN_A_CORE
Met with patient, sister and family to discuss starting lovenox vs performing CT with contrast. Neurology resident and Dr. FLEISCHER M were present as well to discuss with patient. Risks and benefits of both options were discussed. We d/w with pt and sister the option of lovenox and how it is safe in pregnancy, we also went over the side effects of lovenox. Patient and sister agreed to starting lovenox daily. Lovenox 140mg daily started tonight. Will continue to monitor for improvement. Dr. Fleischer and neurology will continue to follow. Discussed with attending Dr. Farias about the plan and agrees with starting lovenox, first dose tonight.

## 2017-07-03 NOTE — PROGRESS NOTE ADULT - ASSESSMENT
26yo female  at 11 weeks gestation p/w UTI, papilledema, HA, blurry vision, suspecting benign intracranial HTN vs Thrombus s/p multiple unsuccessful LP attempts. spoke w/ neuro radiology and neurology- high suspicion for thrombus in venous sinus.

## 2017-07-03 NOTE — PROGRESS NOTE ADULT - SUBJECTIVE AND OBJECTIVE BOX
Patient is a 27y old  Female who presents with a chief complaint of Severe headache (29 Jun 2017 10:29)      SUBJECTIVE / OVERNIGHT EVENTS: still w/ headache, nausea, blurred vision when seen at 2pm    MEDICATIONS  (STANDING):  oxyCODONE  5 mG/acetaminophen 325 mG 1 Tablet(s) Oral once  sodium chloride 0.9%. 1000 milliLiter(s) (100 mL/Hr) IV Continuous <Continuous>  sodium chloride 0.9%. 1000 milliLiter(s) (100 mL/Hr) IV Continuous <Continuous>    MEDICATIONS  (PRN):      Meds ordered within last 24hours  sodium chloride 0.9%.: Solution, 1000 milliLiter(s) infuse at 100 mL/Hr  Provider's Contact #: [phone] (07-03 @ 14:19)        Vital Signs Last 24 Hrs  T(C): 36.7 (07-03-17 @ 13:43), Max: 36.8 (07-02-17 @ 18:15)  HR: 83 (07-03-17 @ 13:43) (72 - 96)  BP: 114/68 (07-03-17 @ 13:43) (102/52 - 116/65)  RR: 17 (07-03-17 @ 13:43) (17 - 18)  SpO2: 100% (07-03-17 @ 13:43) (98% - 100%)  CAPILLARY BLOOD GLUCOSE        I&O's Summary    02 Jul 2017 07:01  -  03 Jul 2017 07:00  --------------------------------------------------------  IN: 720 mL / OUT: 0 mL / NET: 720 mL        PHYSICAL EXAM:  GENERAL: NAD  CHEST/LUNG: Clear to auscultation bilaterally; No wheeze  HEART: Regular rate and rhythm; No murmurs, rubs, or gallops  ABDOMEN: Soft, Nontender, Nondistended; Bowel sounds present  EXTREMITIES:  No clubbing, cyanosis, or edema      LABS:                        10.8   6.38  )-----------( 201      ( 03 Jul 2017 07:03 )             32.7                     RADIOLOGY & ADDITIONAL TESTS:    Imaging Personally Reviewed:    Consultant(s) Notes Reviewed:      Care Discussed with Consultants/Other Providers:

## 2017-07-03 NOTE — PROGRESS NOTE ADULT - PROBLEM SELECTOR PLAN 1
appreciate neuro, hydrate and repeat MRV for r/o thrombus.  discussed w pt options of treating w/ full dose lovenox empirically vs. CT venogram w/ IV contrast to confirm clot and then start lovenox.    BETHANY also came to bedside to discuss above- pt "confused"  wants to think about her options.  waiting for her sister to come as of 4:15pm.

## 2017-07-03 NOTE — CHART NOTE - NSCHARTNOTEFT_GEN_A_CORE
Patient seen and evaluated at the bedside with Dr. Fleischer. Patient headache severe and unchanged in intensity and quality since admission and over the last 2 months. still with blurry vision. Upon review of imaging studies by the Neuroradiologist, there is a high suspicion for venous sinus thrombosis.     Recommendation is for starting therapeutic Lovenox dosing at 1.5mg/kg which has been proven to be safe in pregnancy.    Will round on patient daily to monitor for improvement.    All questions pertaining to the plan and Lovenox were discussed with the patient and her family by Dr. Fleischer.    Willard, MONTY

## 2017-07-04 PROCEDURE — 99233 SBSQ HOSP IP/OBS HIGH 50: CPT

## 2017-07-04 PROCEDURE — 70470 CT HEAD/BRAIN W/O & W/DYE: CPT | Mod: 26

## 2017-07-04 PROCEDURE — 99222 1ST HOSP IP/OBS MODERATE 55: CPT | Mod: GC

## 2017-07-04 RX ADMIN — Medication 1 TABLET(S): at 13:11

## 2017-07-04 NOTE — PROGRESS NOTE ADULT - SUBJECTIVE AND OBJECTIVE BOX
Patient is a 27y old  Female who presents with a chief complaint of Severe headache (29 Jun 2017 10:29)      SUBJECTIVE / OVERNIGHT EVENTS:  Patient reports headache is less intense. Denies focal weakness.    MEDICATIONS  (STANDING):  sodium chloride 0.9%. 1000 milliLiter(s) (100 mL/Hr) IV Continuous <Continuous>  sodium chloride 0.9%. 1000 milliLiter(s) (100 mL/Hr) IV Continuous <Continuous>  enoxaparin Injectable 140 milliGRAM(s) SubCutaneous daily    MEDICATIONS  (PRN):      Vital Signs Last 24 Hrs  T(C): 36.8 (07-04-17 @ 10:00), Max: 37.2 (07-04-17 @ 01:49)  HR: 91 (07-04-17 @ 10:00) (74 - 107)  BP: 104/63 (07-04-17 @ 10:00) (100/47 - 126/79)  RR: 17 (07-04-17 @ 10:00) (16 - 18)  SpO2: 100% (07-04-17 @ 10:00) (96% - 100%)  CAPILLARY BLOOD GLUCOSE        I&O's Summary      PHYSICAL EXAM:  GENERAL: NAD, well-developed  HEAD:  Atraumatic, Normocephalic  EYES: EOMI, PERRLA, conjunctiva and sclera clear  NECK: Supple, No JVD  CHEST/LUNG: Clear to auscultation bilaterally; No wheeze  HEART: Regular rate and rhythm; No murmurs, rubs, or gallops  ABDOMEN: Soft, Nontender, Nondistended; Bowel sounds present  EXTREMITIES:  2+ Peripheral Pulses, No clubbing, cyanosis, or edema  PSYCH: AAOx3  NEUROLOGY: non-focal  SKIN: No rashes or lesions    LABS:                        10.8   6.38  )-----------( 201      ( 03 Jul 2017 07:03 )             32.7       Microbiology:     RADIOLOGY & ADDITIONAL TESTS:    Imaging Personally Reviewed:    Consultant(s) Notes Reviewed:      Care Discussed with Consultants/Other Providers: I discussed the case with neurology Dr. Nuñez who discussed with KRISTYN and they agreed that patient should get CT venogram to r/o cerebral venous thrombosis definitely

## 2017-07-04 NOTE — PROVIDER CONTACT NOTE (MEDICATION) - BACKGROUND
Patient admitted with persistent headache since beginning of pregnancy, 12 weeks pregnant at this time.

## 2017-07-04 NOTE — CHART NOTE - NSCHARTNOTEFT_GEN_A_CORE
Called by Dr. Fry following discussion with neurology and OB. Plan to proceed with CT Venogram. Patient made aware. Called by Dr. Fry following discussion with neurology and OB. Plan to proceed with CT Venogram. Patient made aware and is agreeable with moving ahead with plan.

## 2017-07-04 NOTE — PROGRESS NOTE ADULT - SUBJECTIVE AND OBJECTIVE BOX
MFM Fellow Note     I saw and counseled Ms. Casas along with Dr. Bautista.    She is a 27 year old  at 11 5/7 weeks gestational age with a desired pregnancy presenting with headaches and blurry vision for months. She reports that she is tolerating the pain without analgesia now, but has had episodes where pain is 10/10. She has no vaginal bleeding, LOF or abdominal pain.     Her vital signs are stable and within normal range.     MRI and MRV are suspicious for venous sinus thrombosus.     We discussed the risks/benefits/alternatives of CT venogram in pregnancy. There is minimal risk of exposure to 1 - 10mGy of radiation; threshold for risk in pregnancy is >50mGy over course of pregnancy. There are minimal risks of damage to embryo formation, but this risk is prior to this gestational age, and there is minimal risk of  development of childhood cancers. This risk is decreased with abdominal shielding and with exposure far from the pelvis as in a CT brain.     She desires to proceed with CT venogram.     Short courses of NSAIDs are acceptable for control of maternal pain in pregnancy. We informed her that a short course of motrin or toradol could be used if her pain required control.    Gayle Grubbs MD

## 2017-07-04 NOTE — PROGRESS NOTE ADULT - PROBLEM SELECTOR PLAN 1
-ddx cerebral vein sinus thrombosis vs. pseudotumor cerebri  -case d/w neurology Dr. Nuñez who also d/w MFM, will get CT venogram w/ contrast to r/o cerebral vein sinus thrombosis  -c/w lovenox for now, if negative CT will d/c anticoagulation  -f/u neuro and MFM recs

## 2017-07-04 NOTE — PROGRESS NOTE ADULT - ASSESSMENT
26yo female  at 11 weeks gestation p/w UTI, papilledema, HA, blurry vision, MRI is suggestive of possible venous sinus thrombosis, started on lovenox empirically

## 2017-07-04 NOTE — CHART NOTE - NSCHARTNOTEFT_GEN_A_CORE
Called by neurology regarding current plan of care lovenox vs CT Venogram. This NP spoke with Dr. Nuñez. Attending requesting to speak with medicine attending. Dr valdez is covering attending. MD valdez paged. Awaiting response.

## 2017-07-04 NOTE — PROGRESS NOTE ADULT - SUBJECTIVE AND OBJECTIVE BOX
Patient seen and examined.      She continues to c/o HA, pressure.  Vision is blurred without change.      Non focal neuro exam except for bilateral papilledema.      Appreciate Dr. Castellanos's input regarding this case.  I agree that lovenox should not be continued unless there is solid evidence for sinus thrombosis.  Rec CT venogram to definitively demonstrate thrombosis if present.  If negative, stop lovenox and 24hr later would perform LP with measurement of opening pressure and removal of 30cc CSF as this presentation could be due to benign intracranial hypertension (pseudotumor cerebri).    Above discussed with neurology resident, Dr. Fry of medicine and the patient.

## 2017-07-04 NOTE — PROVIDER CONTACT NOTE (MEDICATION) - ASSESSMENT
AOx4, c/o of headache, refusing pain medication, relief with rest/relaxation.  OOB independently, no other complaints at this time.  No acute distress noted.  Vital signs stable.

## 2017-07-05 LAB
BUN SERPL-MCNC: 6 MG/DL — LOW (ref 7–23)
CALCIUM SERPL-MCNC: 8.8 MG/DL — SIGNIFICANT CHANGE UP (ref 8.4–10.5)
CHLORIDE SERPL-SCNC: 100 MMOL/L — SIGNIFICANT CHANGE UP (ref 98–107)
CO2 SERPL-SCNC: 25 MMOL/L — SIGNIFICANT CHANGE UP (ref 22–31)
CREAT SERPL-MCNC: 0.62 MG/DL — SIGNIFICANT CHANGE UP (ref 0.5–1.3)
GLUCOSE SERPL-MCNC: 82 MG/DL — SIGNIFICANT CHANGE UP (ref 70–99)
HCT VFR BLD CALC: 31.8 % — LOW (ref 34.5–45)
HGB BLD-MCNC: 10.6 G/DL — LOW (ref 11.5–15.5)
MCHC RBC-ENTMCNC: 27.2 PG — SIGNIFICANT CHANGE UP (ref 27–34)
MCHC RBC-ENTMCNC: 33.3 % — SIGNIFICANT CHANGE UP (ref 32–36)
MCV RBC AUTO: 81.7 FL — SIGNIFICANT CHANGE UP (ref 80–100)
NRBC # FLD: 0 — SIGNIFICANT CHANGE UP
PLATELET # BLD AUTO: 218 K/UL — SIGNIFICANT CHANGE UP (ref 150–400)
PMV BLD: 9.9 FL — SIGNIFICANT CHANGE UP (ref 7–13)
POTASSIUM SERPL-MCNC: 4.1 MMOL/L — SIGNIFICANT CHANGE UP (ref 3.5–5.3)
POTASSIUM SERPL-SCNC: 4.1 MMOL/L — SIGNIFICANT CHANGE UP (ref 3.5–5.3)
RBC # BLD: 3.89 M/UL — SIGNIFICANT CHANGE UP (ref 3.8–5.2)
RBC # FLD: 15.7 % — HIGH (ref 10.3–14.5)
SODIUM SERPL-SCNC: 140 MMOL/L — SIGNIFICANT CHANGE UP (ref 135–145)
WBC # BLD: 6.31 K/UL — SIGNIFICANT CHANGE UP (ref 3.8–10.5)
WBC # FLD AUTO: 6.31 K/UL — SIGNIFICANT CHANGE UP (ref 3.8–10.5)

## 2017-07-05 PROCEDURE — 99233 SBSQ HOSP IP/OBS HIGH 50: CPT

## 2017-07-05 RX ORDER — ENOXAPARIN SODIUM 100 MG/ML
140 INJECTION SUBCUTANEOUS DAILY
Qty: 0 | Refills: 0 | Status: DISCONTINUED | OUTPATIENT
Start: 2017-07-05 | End: 2017-07-05

## 2017-07-05 RX ORDER — ENOXAPARIN SODIUM 100 MG/ML
100 INJECTION SUBCUTANEOUS EVERY 12 HOURS
Qty: 0 | Refills: 0 | Status: DISCONTINUED | OUTPATIENT
Start: 2017-07-05 | End: 2017-07-05

## 2017-07-05 RX ORDER — ENOXAPARIN SODIUM 100 MG/ML
100 INJECTION SUBCUTANEOUS EVERY 12 HOURS
Qty: 0 | Refills: 0 | Status: DISCONTINUED | OUTPATIENT
Start: 2017-07-06 | End: 2017-07-07

## 2017-07-05 RX ORDER — ENOXAPARIN SODIUM 100 MG/ML
140 INJECTION SUBCUTANEOUS ONCE
Qty: 0 | Refills: 0 | Status: COMPLETED | OUTPATIENT
Start: 2017-07-05 | End: 2017-07-05

## 2017-07-05 RX ADMIN — SODIUM CHLORIDE 100 MILLILITER(S): 9 INJECTION INTRAMUSCULAR; INTRAVENOUS; SUBCUTANEOUS at 13:49

## 2017-07-05 RX ADMIN — ENOXAPARIN SODIUM 140 MILLIGRAM(S): 100 INJECTION SUBCUTANEOUS at 13:48

## 2017-07-05 RX ADMIN — Medication 1 TABLET(S): at 12:07

## 2017-07-05 RX ADMIN — SODIUM CHLORIDE 100 MILLILITER(S): 9 INJECTION INTRAMUSCULAR; INTRAVENOUS; SUBCUTANEOUS at 23:11

## 2017-07-05 NOTE — PROGRESS NOTE ADULT - PROBLEM SELECTOR PLAN 1
due to cerebral vein sinus thrombosis as confirmed on CT head  -c/w lovenox full dose- 140mg X1 today then 100mg bid tomorrow.  spoke w/ pt and her mother at bedside  iv hydration for contrast to flush out

## 2017-07-05 NOTE — PROGRESS NOTE ADULT - SUBJECTIVE AND OBJECTIVE BOX
Patient is a 27y old  Female who presents with a chief complaint of Severe headache (29 Jun 2017 10:29)      SUBJECTIVE / OVERNIGHT EVENTS:still w/ headache blurred vision    MEDICATIONS  (STANDING):  sodium chloride 0.9%. 1000 milliLiter(s) (100 mL/Hr) IV Continuous <Continuous>  sodium chloride 0.9%. 1000 milliLiter(s) (100 mL/Hr) IV Continuous <Continuous>  prenatal multivitamin 1 Tablet(s) Oral daily    MEDICATIONS  (PRN):      Meds ordered within last 24hours  enoxaparin Injectable: [Ordered as LOVENOX]  140 milliGRAM(s), SubCutaneous, daily  Indication: sinus thrombosis  Provider's Contact #: [phone] (07-05 @ 06:55)  enoxaparin Injectable: [Ordered as LOVENOX]  100 milliGRAM(s), SubCutaneous, every 12 hours  Provider's Contact #: [phone] (07-05 @ 11:03)  enoxaparin Injectable: [Ordered as LOVENOX]  100 milliGRAM(s), SubCutaneous, every 12 hours  Provider's Contact #: [phone] (07-05 @ 13:05)  enoxaparin Injectable: [Ordered as LOVENOX]  140 milliGRAM(s), SubCutaneous, once, Stop After 1 Doses  Provider's Contact #: [phone] (07-05 @ 13:05)        Vital Signs Last 24 Hrs  T(C): 36.9 (07-05-17 @ 13:44), Max: 37.1 (07-05-17 @ 01:44)  HR: 88 (07-05-17 @ 13:44) (73 - 88)  BP: 119/69 (07-05-17 @ 13:44) (101/63 - 119/69)  RR: 18 (07-05-17 @ 13:44) (16 - 18)  SpO2: 100% (07-05-17 @ 13:44) (99% - 100%)  CAPILLARY BLOOD GLUCOSE        I&O's Summary      PHYSICAL EXAM:  GENERAL: NAD  CHEST/LUNG: Clear to auscultation bilaterally; No wheeze  HEART: Regular rate and rhythm; No murmurs, rubs, or gallops  ABDOMEN: Soft, Nontender, Nondistended; Bowel sounds present  EXTREMITIES:  No clubbing, cyanosis, or edema      LABS:                        10.6   6.31  )-----------( 218      ( 05 Jul 2017 07:00 )             31.8     07-05    140  |  100  |  6<L>  ----------------------------<  82  4.1   |  25  |  0.62    Ca    8.8      05 Jul 2017 07:00                RADIOLOGY & ADDITIONAL TESTS:    Imaging Personally Reviewed:    Consultant(s) Notes Reviewed:      Care Discussed with Consultants/Other Providers:

## 2017-07-05 NOTE — PROGRESS NOTE ADULT - ASSESSMENT
28yo female  at 11 weeks gestation p/w UTI, papilledema, HA, blurry vision, MRI is suggestive of possible venous sinus thrombosis, started on lovenox empirically

## 2017-07-05 NOTE — PROVIDER CONTACT NOTE (OTHER) - SITUATION
patient with visual changes for 2 months since her migraines began. Pt states her blurry vision is back this morning,

## 2017-07-05 NOTE — PROGRESS NOTE ADULT - ATTENDING COMMENTS
I have seen and examined this patient with the stroke neurology team.     Overnight events were reviewed with the patient and available family members.   ROS: All negative except documented in the HPI.   Neurological exam was performed and agree with exam as documented above.   Laboratory results and imaging studies were reviewed by me.   I agree with the neurology resident note as documented above.    26 Y/O woman with vascular risk factor of hypercoagulability from being pregnant is evaluated at [facility] for headaches, nausea, vomiting and bilateral blurring of the vision. She reports to have severe HAs, constant since last 2-3 weeks and blurring of the vision. I have seen and examined this patient with the stroke neurology team.     Overnight events were reviewed with the patient and available family members.   ROS: All negative except documented in the HPI.   Neurological exam was performed and agree with exam as documented above.   Laboratory results and imaging studies were reviewed by me.   I agree with the neurology resident note as documented above.    28 Y/O woman with vascular risk factor of hypercoagulability from being pregnant is evaluated at [facility] for headaches, nausea, vomiting and bilateral blurring of the vision. She reports to have severe HAs, constant since last 2-3 weeks and blurring of the vision. Her CT brain did not show any acute intracranial pathology and CTV to my eyes shows incomplete occlusion of the R distal transverse and proximal sigmoid sinus. Impression: Cerebral venous sinus thrombosis - likely etiology being secondary hypercoagulable state from pregnancy but possibility of underlying primary hypercoagulable state needs to be ruled out. Plan: Agree with therapeutic anticoagulation with s/c Lovenox 1 mg/Kg q 12 H and consider to adjusting the dose as per body weight or anti Xa level. Hematologist and OB-GYN consultation for optimal dosing of Lovenox. Follow up with ophthal vs. neuro-ophthal for further evaluation and management of papilledema. Consider work up for hypercoagulable state. Would cont to follow.     Above mentioned plan was discussed with patient in detail. All the questions were answered and concerns were addressed.

## 2017-07-05 NOTE — PROGRESS NOTE ADULT - ASSESSMENT
The patient is a 28 yo female that is 11 weeks pregnant with progressive headache for one month and papilledema discovered to have a venous sinus thrombosis at this time, confirmed via CT venogram:  -Recommend 1 mg/kg -Lovenox two times a day - discussed with stroke attending  -Would recommend hematology consult as well  -No further neuro-imaging  -Patient will need to follow with Vascular Neurology as an outpatient at [address] [place] [address] [phone]  -Stroke Neurology will see in AM The patient is a 28 yo female that is 11 weeks pregnant with progressive headache for one month and papilledema discovered to have a venous sinus thrombosis at this time, confirmed via CT venogram:  - Ophthalmology consult for papilledema   -Recommend 1 mg/kg -Lovenox two times a day - discussed with stroke attending  -Would recommend hematology consult as well   -No further neuro-imaging  - recommend preliminary hypercoagulable workup done (BERT, antiphospholipid syndrome, etc), and repeat full panel after pregnancy   -Patient will need to follow with Vascular Neurology as an outpatient at [address] [place] [address] [phone]

## 2017-07-05 NOTE — PROVIDER CONTACT NOTE (OTHER) - ASSESSMENT
pt aox3, denies any headache or discomfort. patient states she has blurry and "flash like" vision. She states it takes her a few moments to see a picture clearly.
AOx4, c/o of intermittent headaches, resting comfortably in bed.  Arrived back to unit from MRI, vital signs as above.  No acute distress noted.

## 2017-07-06 LAB
BUN SERPL-MCNC: 8 MG/DL — SIGNIFICANT CHANGE UP (ref 7–23)
CALCIUM SERPL-MCNC: 8.9 MG/DL — SIGNIFICANT CHANGE UP (ref 8.4–10.5)
CHLORIDE SERPL-SCNC: 99 MMOL/L — SIGNIFICANT CHANGE UP (ref 98–107)
CO2 SERPL-SCNC: 19 MMOL/L — LOW (ref 22–31)
CREAT SERPL-MCNC: 0.61 MG/DL — SIGNIFICANT CHANGE UP (ref 0.5–1.3)
GLUCOSE SERPL-MCNC: 70 MG/DL — SIGNIFICANT CHANGE UP (ref 70–99)
HCT VFR BLD CALC: 34.9 % — SIGNIFICANT CHANGE UP (ref 34.5–45)
HGB BLD-MCNC: 11.3 G/DL — LOW (ref 11.5–15.5)
MCHC RBC-ENTMCNC: 26.6 PG — LOW (ref 27–34)
MCHC RBC-ENTMCNC: 32.4 % — SIGNIFICANT CHANGE UP (ref 32–36)
MCV RBC AUTO: 82.1 FL — SIGNIFICANT CHANGE UP (ref 80–100)
NRBC # FLD: 0 — SIGNIFICANT CHANGE UP
PLATELET # BLD AUTO: 240 K/UL — SIGNIFICANT CHANGE UP (ref 150–400)
PMV BLD: 10.2 FL — SIGNIFICANT CHANGE UP (ref 7–13)
POTASSIUM SERPL-MCNC: 4.4 MMOL/L — SIGNIFICANT CHANGE UP (ref 3.5–5.3)
POTASSIUM SERPL-SCNC: 4.4 MMOL/L — SIGNIFICANT CHANGE UP (ref 3.5–5.3)
RBC # BLD: 4.25 M/UL — SIGNIFICANT CHANGE UP (ref 3.8–5.2)
RBC # FLD: 15.8 % — HIGH (ref 10.3–14.5)
SODIUM SERPL-SCNC: 132 MMOL/L — LOW (ref 135–145)
WBC # BLD: 7.18 K/UL — SIGNIFICANT CHANGE UP (ref 3.8–10.5)
WBC # FLD AUTO: 7.18 K/UL — SIGNIFICANT CHANGE UP (ref 3.8–10.5)

## 2017-07-06 PROCEDURE — 99233 SBSQ HOSP IP/OBS HIGH 50: CPT

## 2017-07-06 RX ORDER — ENOXAPARIN SODIUM 100 MG/ML
1 INJECTION SUBCUTANEOUS
Qty: 1 | Refills: 0 | OUTPATIENT
Start: 2017-07-06 | End: 2017-08-05

## 2017-07-06 RX ADMIN — ENOXAPARIN SODIUM 100 MILLIGRAM(S): 100 INJECTION SUBCUTANEOUS at 08:00

## 2017-07-06 RX ADMIN — Medication 1 TABLET(S): at 11:33

## 2017-07-06 RX ADMIN — ENOXAPARIN SODIUM 100 MILLIGRAM(S): 100 INJECTION SUBCUTANEOUS at 19:30

## 2017-07-06 NOTE — PROGRESS NOTE ADULT - PROBLEM SELECTOR PLAN 1
due to cerebral vein sinus thrombosis as confirmed on CT head  -c/w lovenox full dose-100mg bid   per neuro- symptoms will take a while to resolve  possible d/c home tomorrow or sat   await Dr. Mckeon [facility] medicine eval as well

## 2017-07-06 NOTE — DOWNTIME INTERRUPTION NOTE - WHICH MANUAL FORMS INITIATED?
Medications as per MAR  Newly ordered medications      *Please refer to hard copy MAR for further documentation details.* Medications as per MAR        *Please refer to hard copy MAR for further documentation details.*

## 2017-07-06 NOTE — PROGRESS NOTE ADULT - SUBJECTIVE AND OBJECTIVE BOX
Patient is a 27y old  Female who presents with a chief complaint of Severe headache (29 Jun 2017 10:29)      SUBJECTIVE / OVERNIGHT EVENTS: seen at 3p  was still c/o headache and blurred vision  also w/ fatigue and nausea    MEDICATIONS  (STANDING):  sodium chloride 0.9%. 1000 milliLiter(s) (100 mL/Hr) IV Continuous <Continuous>  sodium chloride 0.9%. 1000 milliLiter(s) (100 mL/Hr) IV Continuous <Continuous>  prenatal multivitamin 1 Tablet(s) Oral daily  enoxaparin Injectable 100 milliGRAM(s) SubCutaneous every 12 hours    MEDICATIONS  (PRN):      Meds ordered within last 24hours        Vital Signs Last 24 Hrs  T(C): 36.8 (07-06-17 @ 14:10), Max: 36.9 (07-05-17 @ 17:07)  HR: 70 (07-06-17 @ 14:10) (70 - 77)  BP: 112/74 (07-06-17 @ 14:10) (106/62 - 115/70)  RR: 18 (07-06-17 @ 14:10) (16 - 18)  SpO2: 100% (07-06-17 @ 14:10) (100% - 100%)  CAPILLARY BLOOD GLUCOSE        I&O's Summary      PHYSICAL EXAM:  GENERAL: NAD  CHEST/LUNG: Clear to auscultation bilaterally; No wheeze  HEART: Regular rate and rhythm; No murmurs, rubs, or gallops  ABDOMEN: Soft, Nontender, Nondistended; Bowel sounds present  EXTREMITIES:  No clubbing, cyanosis, or edema      LABS:                        11.3   7.18  )-----------( 240      ( 06 Jul 2017 07:30 )             34.9     07-06    132<L>  |  99  |  8   ----------------------------<  70  4.4   |  19<L>  |  0.61    Ca    8.9      06 Jul 2017 07:30                RADIOLOGY & ADDITIONAL TESTS:    Imaging Personally Reviewed:    Consultant(s) Notes Reviewed:      Care Discussed with Consultants/Other Providers:

## 2017-07-06 NOTE — PROGRESS NOTE ADULT - ASSESSMENT
28yo female  at 11 weeks gestation p/w UTI, papilledema, HA, blurry vision,CT venogram showed venous sinus thrombosis, started on lovenox full dose

## 2017-07-07 VITALS
OXYGEN SATURATION: 100 % | HEART RATE: 86 BPM | DIASTOLIC BLOOD PRESSURE: 64 MMHG | RESPIRATION RATE: 18 BRPM | SYSTOLIC BLOOD PRESSURE: 115 MMHG | TEMPERATURE: 98 F

## 2017-07-07 DIAGNOSIS — G08 INTRACRANIAL AND INTRASPINAL PHLEBITIS AND THROMBOPHLEBITIS: ICD-10-CM

## 2017-07-07 DIAGNOSIS — Z51.81 ENCOUNTER FOR THERAPEUTIC DRUG LEVEL MONITORING: ICD-10-CM

## 2017-07-07 LAB
BUN SERPL-MCNC: 6 MG/DL — LOW (ref 7–23)
CALCIUM SERPL-MCNC: 9.3 MG/DL — SIGNIFICANT CHANGE UP (ref 8.4–10.5)
CHLORIDE SERPL-SCNC: 97 MMOL/L — LOW (ref 98–107)
CO2 SERPL-SCNC: 24 MMOL/L — SIGNIFICANT CHANGE UP (ref 22–31)
CREAT SERPL-MCNC: 0.58 MG/DL — SIGNIFICANT CHANGE UP (ref 0.5–1.3)
GLUCOSE SERPL-MCNC: 82 MG/DL — SIGNIFICANT CHANGE UP (ref 70–99)
POTASSIUM SERPL-MCNC: 4 MMOL/L — SIGNIFICANT CHANGE UP (ref 3.5–5.3)
POTASSIUM SERPL-SCNC: 4 MMOL/L — SIGNIFICANT CHANGE UP (ref 3.5–5.3)
SODIUM SERPL-SCNC: 137 MMOL/L — SIGNIFICANT CHANGE UP (ref 135–145)

## 2017-07-07 PROCEDURE — 99239 HOSP IP/OBS DSCHRG MGMT >30: CPT

## 2017-07-07 PROCEDURE — 99223 1ST HOSP IP/OBS HIGH 75: CPT

## 2017-07-07 RX ORDER — ENOXAPARIN SODIUM 100 MG/ML
1 INJECTION SUBCUTANEOUS
Qty: 60 | Refills: 0 | OUTPATIENT
Start: 2017-07-07 | End: 2017-08-06

## 2017-07-07 RX ORDER — IBUPROFEN 200 MG
1 TABLET ORAL
Qty: 0 | Refills: 0 | COMMUNITY
Start: 2017-07-07

## 2017-07-07 RX ADMIN — ENOXAPARIN SODIUM 100 MILLIGRAM(S): 100 INJECTION SUBCUTANEOUS at 07:18

## 2017-07-07 RX ADMIN — ENOXAPARIN SODIUM 100 MILLIGRAM(S): 100 INJECTION SUBCUTANEOUS at 19:55

## 2017-07-07 RX ADMIN — SODIUM CHLORIDE 100 MILLILITER(S): 9 INJECTION INTRAMUSCULAR; INTRAVENOUS; SUBCUTANEOUS at 07:19

## 2017-07-07 RX ADMIN — Medication 1 TABLET(S): at 13:13

## 2017-07-07 NOTE — PROGRESS NOTE ADULT - SUBJECTIVE AND OBJECTIVE BOX
Patient is a 27y old  Female who presents with a chief complaint of Severe headache (29 Jun 2017 10:29)      SUBJECTIVE / OVERNIGHT EVENTS:still w/ headache, blurred vision; also w/ nausea/fatigue    MEDICATIONS  (STANDING):  sodium chloride 0.9%. 1000 milliLiter(s) (100 mL/Hr) IV Continuous <Continuous>  prenatal multivitamin 1 Tablet(s) Oral daily  enoxaparin Injectable 100 milliGRAM(s) SubCutaneous every 12 hours    MEDICATIONS  (PRN):      Meds ordered within last 24hours        Vital Signs Last 24 Hrs  T(C): 37.1 (07-07-17 @ 10:39), Max: 37.1 (07-07-17 @ 10:39)  HR: 77 (07-07-17 @ 10:39) (70 - 84)  BP: 100/60 (07-07-17 @ 10:39) (100/60 - 118/81)  RR: 16 (07-07-17 @ 10:39) (16 - 18)  SpO2: 100% (07-07-17 @ 10:39) (100% - 100%)  CAPILLARY BLOOD GLUCOSE        I&O's Summary      PHYSICAL EXAM:  GENERAL: NAD  CHEST/LUNG: Clear to auscultation bilaterally; No wheeze  HEART: Regular rate and rhythm; No murmurs, rubs, or gallops  ABDOMEN: Soft, Nontender, Nondistended; Bowel sounds present  EXTREMITIES:  No clubbing, cyanosis, or edema      LABS:                        11.3   7.18  )-----------( 240      ( 06 Jul 2017 07:30 )             34.9     07-06    132<L>  |  99  |  8   ----------------------------<  70  4.4   |  19<L>  |  0.61    Ca    8.9      06 Jul 2017 07:30                RADIOLOGY & ADDITIONAL TESTS:    Imaging Personally Reviewed:    Consultant(s) Notes Reviewed:  Dr. Mike Jarvis Discussed with Consultants/Other Providers:

## 2017-07-07 NOTE — PROGRESS NOTE ADULT - PROBLEM SELECTOR PROBLEM 1
Idiopathic intracranial hypertension
R/O Idiopathic intracranial hypertension
Papilledema, both eyes
R/O Idiopathic intracranial hypertension

## 2017-07-07 NOTE — CONSULT NOTE ADULT - ASSESSMENT
26 yo woman who is currently 11 weeks pregnant with incidental finding of sinus venous thrombosis.  Unclear if this is provoked by pregnancy.  Currently tolerating anticoagulation with Lovenox 1 mg/kg BID.

## 2017-07-07 NOTE — CONSULT NOTE ADULT - CONSULT REASON
11wk pregnant, starting cat C drug
Early pregnancy with severe headache, possible benign intracranial hypertension
Sinus venous thrombosis, pregnancy, anticoagulation management
blurry vision

## 2017-07-07 NOTE — PROGRESS NOTE ADULT - PROBLEM SELECTOR PROBLEM 2
11 weeks gestation of pregnancy

## 2017-07-07 NOTE — CONSULT NOTE ADULT - SUBJECTIVE AND OBJECTIVE BOX
Cardiology/Vascular Medicine Inpatient Consultation Note    Asked by Dr. Farias to evaluate patient with SVT, pregnancy, anticoagulation management    HISTORY OF PRESENT ILLNESS:  Patient is a 27 year old woman who is currently 11 weeks gestation (), with PMH of migraine headaches, sent to the ED by ophthalmologist secondary to papilledema.  States that she has had blurry vision and feeling "stuffy" as if she had sinus congestion over the past several weeks.  She does not recall the exact timing for onset of symptoms.  CTV and MRI of head identified thrombus in the lateral aspect of the right and transverse sinus as well as the right sigmoid sinus.  She was started on Lovenox 1 mg/kg, which she is tolerating now.    Denies previous VTE.  Family history of ? VTE in a couple of relatives (she thinks her grandparents might have had VTE later in life), but otherwise appears unremarkable.    < from: CT Venogram Brain w/ IV Cont (17 @ 18:51) >  Impression: Thrombus again identified involving the lateral aspect of the   right and transverse sinus with thrombus now seen involving the right   sigmoid sinus.         Allergies  Benadryl (Hives)  cortisone (Hives)  pcn (Unknown)  Tylenol (Hives)  Tylenol (Urticaria)  Zofran (Anaphylaxis)  Zofran (Rash)  	    MEDICATIONS:  enoxaparin Injectable 100 milliGRAM(s) SubCutaneous every 12 hours    sodium chloride 0.9%. 1000 milliLiter(s) IV Continuous <Continuous>  prenatal multivitamin 1 Tablet(s) Oral daily      PAST MEDICAL & SURGICAL HISTORY:  Ophthalmalgia, bilateral  Photophobia, bilateral  11 weeks gestation of pregnancy  Migraines  No significant past surgical history      FAMILY HISTORY:  Family history of diabetes mellitus (Grandparent)  Family history of hypertension (Grandparent): grandmother      SOCIAL HISTORY:    [ ] Non-smoker    REVIEW OF SYSTEMS:  CONSTITUTIONAL: No fever, weight loss, or fatigue  EYES: No eye pain, visual disturbances, or discharge  ENMT:  No difficulty hearing, tinnitus, vertigo; No sinus or throat pain  NECK: No pain or stiffness  RESPIRATORY: No cough, wheezing, chills or hemoptysis; No Shortness of Breath  CARDIOVASCULAR: No chest pain, palpitations, passing out, dizziness, or leg swelling  GASTROINTESTINAL: No abdominal or epigastric pain. No nausea, vomiting, or hematemesis; No diarrhea or constipation. No melena or hematochezia.  GENITOURINARY: No dysuria, frequency, hematuria, or incontinence  NEUROLOGICAL: +headaches, blurry vision  SKIN: No itching, burning, rashes, or lesions   LYMPH Nodes: No enlarged glands  ENDOCRINE: No heat or cold intolerance; No hair loss  MUSCULOSKELETAL: No joint pain or swelling; No muscle, back, or extremity pain  PSYCHIATRIC: No depression, anxiety, mood swings, or difficulty sleeping  HEME/LYMPH: No easy bruising, or bleeding gums  ALLERGY AND IMMUNOLOGIC: No hives or eczema	      PHYSICAL EXAM:  T(C): 36.8 (17 @ 06:00), Max: 36.9 (17 @ 10:49)  HR: 80 (17 @ 06:00) (70 - 84)  BP: 109/64 (17 @ 06:00) (106/62 - 118/81)  RR: 16 (17 @ 06:00) (16 - 18)  SpO2: 100% (17 @ 06:00) (100% - 100%)      Appearance: Normal, laying flat in bed	  HEENT:   Normal oral mucosa, PERRL, EOMI	  Lymphatic: No lymphadenopathy  Cardiovascular: Normal S1 S2, No JVD, No murmurs, No edema  Respiratory: Lungs clear to auscultation	  Psychiatry: A & O x 3, Mood & affect appropriate  Gastrointestinal:  Soft, Non-tender, + BS	  Skin: No rashes, No ecchymoses, No cyanosis	  Neurologic: Non-focal  Extremities: Normal range of motion, No clubbing, cyanosis or edema  Vascular: Peripheral pulses palpable 2+ bilaterally      LABS:	 	                          11.3   7.18  )-----------( 240      ( 2017 07:30 )             34.9     07-    132<L>  |  99  |  8   ----------------------------<  70  4.4   |  19<L>  |  0.61    Ca    8.9      2017 07:30      < from: CT Venogram Brain w/ IV Cont (17 @ 18:51) >  EXAM:  CT VENOGRAM BRAIN (W)AW IC        PROCEDURE DATE:  2017         INTERPRETATION:  History: 11 weeks gestation. Papilledema. Migraine   headaches.    Multiple axial sections were performed from base of skull to vertex   without contrastenhancement. The patient was then injected with   approximately 80 cc of Omnipaque 350 IV and CT venogram was performed.    This exam is compared with prior MRI the brain performed on 2017   and MR venogram performed on 2017.    The lateral ventricles have normal configuration.    There is no evidence of acute hemorrhage, mass, mass effect or acute   territorial infarct seen.    Evaluation of the osseous structures with appropriate window appears   unremarkable    The visualized paranasal sinuses mastoid and middle ear regions appear   clear.     Evaluation of the superior sagittal, straight, and left transverse sinus   appears normal. There is evidence of a filling defect seen involving the   lateral aspect of the right transverse sinus with involvement of the   right sigmoid sinus now seen. This is consistent with patient's known   venous sinus thrombus. Normal enhancement of the left sigmoid sinus as   well as both internal jugular veins. Evaluation of the [place]   appears grossly unremarkable    No abnormal areas enhancement seen posterior fossa supratentorial region    Impression: Thrombus again identified involving the lateral aspect of the   right and transverse sinus with thrombus now seen involving the right   sigmoid sinus.     MITCHEL MONET M.D., ATTENDING RADIOLOGIST  This document has been electronically signed. 2017  8:39AM            < from: MRI Venogram Head w/o Cont (17 @ 14:31) >  EXAM:  MR VENOGRAM HEAD W O CON        PROCEDURE DATE:  2017         INTERPRETATION:    CLINICAL INDICATION: Evaluate for venous thrombosis previous MRV   2017 suspicious      Noncontrast sagittal MR venography was obtained and comparison is made   with the prior MRV of 2017 and 2017.         There to be a filling defect at the junction of the right transverse and   right sigmoid sinus with some flow distally in the right sigmoid sinus   and jugular vein. This is similar tothe appearance of 2017 and is   suspicious for a venous thrombosis but alternatively may be related to   turbulent flow at the junction of the dominant right transverse and   sigmoid sinus. Otherwise there is good flow identified in the   intracranial venous structures. This includes the superior sagittal   sinus, inferior sagittal sinus, internal cerebral veins, vein of [place],   straight sinus, transverse sinuses bilaterally. The cortical veins are   normal. The right transverse sinus is dominant.    Based on this appearance, the possibility of turbulent flow is suggested   as the original MRI of the brain of 6/15/2017 was unremarkable. Either a   postcontrast scan may be obtained or an MRI of the brain parenchyma. If   sinus thrombosishas occurred, the thrombus should now demonstrates   signal hyperintensity on the T1-weighted images.    Impression: No change in apparent filling defect of the distal right   transverse sinus extending into the right sigmoid sinus suspicious for   incompletely occluding sinus thrombosis. However, as this may be due   turbulent flow at the junction of the dominant right transverse and   sigmoid sinus. Either a repeat MRI of the brain is recommended to assess   for evolution of blood products, or a postcontrast scan MRV may be   obtained.    Dr. Myrick discussed these findings with the covering resident on 2017   3:09 PM with read back.

## 2017-07-07 NOTE — CONSULT NOTE ADULT - PROBLEM SELECTOR RECOMMENDATION 3
Discussed at length plan of care, duration of anticoagulation.  Plan for outpatient evaluation/follow-up.

## 2017-07-07 NOTE — PROGRESS NOTE ADULT - PROBLEM SELECTOR PLAN 2
-stable,. cw prenatal vitamins, f/u MFM and OB
-stable,. cw prenatal vitamins, f/u MFM and OB
-stable,. cw prenatal vitamins, f/u MFM and OB- d/w Dr. Figueredo- ok w/ dc home today outpt f/u appt for 7/18  nausea/fatigue likely d/t pregnancy  hyponatremia- could be d/t decr intake will recheck today
appreciate MFM and OB darrius. cw prenatal vitamins,
-stable,. cw prenatal vitamins, f/u MFM and OB
appreciate MFM and OB ayleenal. cw prenatal vitamins, f.u US. patient deciding to hold off on Diamox for now considering risk and benefits.
appreciate MFM and OB darrius. cw prenatal vitamins, f.u US
cont prenatal vitamins  OB per routine
OB darrius. cw prenatal vitamins

## 2017-07-07 NOTE — CONSULT NOTE ADULT - PROBLEM SELECTOR RECOMMENDATION 9
Unclear whether this is provoked by pregnancy.  Agree with strategy of anticoagulation with Lovenox 1 mg/kg BID for duration of pregnancy until about time of delivery.  Will review kurtis-procedural anticoagulation with OB team.  Eventual hypercoagulable workup to be performed in the office.  She can follow up with me in 1-2 weeks.

## 2017-07-12 ENCOUNTER — ASOB RESULT (OUTPATIENT)
Age: 27
End: 2017-07-12

## 2017-07-12 ENCOUNTER — APPOINTMENT (OUTPATIENT)
Dept: ANTEPARTUM | Facility: CLINIC | Age: 27
End: 2017-07-12
Payer: MEDICAID

## 2017-07-12 PROCEDURE — 76801 OB US < 14 WKS SINGLE FETUS: CPT

## 2017-07-12 PROCEDURE — 36416 COLLJ CAPILLARY BLOOD SPEC: CPT

## 2017-07-12 PROCEDURE — 76813 OB US NUCHAL MEAS 1 GEST: CPT

## 2017-07-13 ENCOUNTER — APPOINTMENT (OUTPATIENT)
Dept: CARDIOLOGY | Facility: CLINIC | Age: 27
End: 2017-07-13

## 2017-07-13 ENCOUNTER — NON-APPOINTMENT (OUTPATIENT)
Age: 27
End: 2017-07-13

## 2017-07-13 VITALS
WEIGHT: 207 LBS | DIASTOLIC BLOOD PRESSURE: 79 MMHG | BODY MASS INDEX: 31.37 KG/M2 | RESPIRATION RATE: 16 BRPM | HEIGHT: 68 IN | OXYGEN SATURATION: 99 % | SYSTOLIC BLOOD PRESSURE: 113 MMHG | HEART RATE: 81 BPM

## 2017-07-13 DIAGNOSIS — H53.9 UNSPECIFIED VISUAL DISTURBANCE: ICD-10-CM

## 2017-07-15 LAB
ANA SER IF-ACNC: NEGATIVE
APCR PPP: 2.75 RATIO
APTT BLD: 39 SEC
B2 GLYCOPROT1 IGA SERPL IA-ACNC: <5 SAU
C3 SERPL-MCNC: 188 MG/DL
C4 SERPL-MCNC: 52 MG/DL
CRP SERPL-MCNC: 0.7 MG/DL
DEPRECATED D DIMER PPP IA-ACNC: 150 NG/ML DDU
FACT IX ACT/NOR PPP: 120 %
FACT VII ACT/NOR PPP: 88 %
FACT VIII ACT/NOR PPP: 264 %
FACT XI ACT/NOR PPP: 127 %
FIBRINOGEN PPP COAG.DERIVED-MCNC: 920 MG/DL
FVL BLANK: 38.6
FVL TEST: 106.1
HCYS SERPL-MCNC: 7.2 UMOL/L
INR PPP: 0.97 RATIO
PROT C PPP CHRO-ACNC: 133 %
PROT S AG ACT/NOR PPP IA: 51 %
PT BLD: 10.9 SEC
THYROGLOB AB SERPL-ACNC: <20 IU/ML
THYROPEROXIDASE AB SERPL IA-ACNC: <10 IU/ML

## 2017-07-20 LAB
CARDIOLIPIN AB SER IA-ACNC: NEGATIVE
DNA PLOIDY SPEC FC-IMP: NORMAL
DSDNA AB SER-ACNC: <12 IU/ML
PTR INTERP: NORMAL
TPA AG PPP IA-MCNC: <5 IU/ML

## 2017-07-27 ENCOUNTER — APPOINTMENT (OUTPATIENT)
Dept: CV DIAGNOSITCS | Facility: HOSPITAL | Age: 27
End: 2017-07-27
Payer: MEDICAID

## 2017-07-27 ENCOUNTER — OUTPATIENT (OUTPATIENT)
Dept: OUTPATIENT SERVICES | Facility: HOSPITAL | Age: 27
LOS: 1 days | End: 2017-07-27

## 2017-07-27 DIAGNOSIS — R94.31 ABNORMAL ELECTROCARDIOGRAM [ECG] [EKG]: ICD-10-CM

## 2017-07-27 PROCEDURE — 93306 TTE W/DOPPLER COMPLETE: CPT | Mod: 26

## 2017-07-29 LAB — SSDNA AB SER-ACNC: 13 U/ML

## 2017-07-31 ENCOUNTER — APPOINTMENT (OUTPATIENT)
Dept: NEUROLOGY | Facility: CLINIC | Age: 27
End: 2017-07-31

## 2017-08-01 ENCOUNTER — RX RENEWAL (OUTPATIENT)
Age: 27
End: 2017-08-01

## 2017-08-10 ENCOUNTER — APPOINTMENT (OUTPATIENT)
Dept: NEUROLOGY | Facility: CLINIC | Age: 27
End: 2017-08-10
Payer: MEDICAID

## 2017-08-10 VITALS
HEIGHT: 68 IN | BODY MASS INDEX: 31.83 KG/M2 | WEIGHT: 210 LBS | DIASTOLIC BLOOD PRESSURE: 71 MMHG | HEART RATE: 92 BPM | SYSTOLIC BLOOD PRESSURE: 108 MMHG

## 2017-08-10 PROCEDURE — 99215 OFFICE O/P EST HI 40 MIN: CPT

## 2017-08-16 ENCOUNTER — APPOINTMENT (OUTPATIENT)
Dept: OPHTHALMOLOGY | Facility: CLINIC | Age: 27
End: 2017-08-16
Payer: MEDICAID

## 2017-08-16 PROCEDURE — 99204 OFFICE O/P NEW MOD 45 MIN: CPT

## 2017-08-16 PROCEDURE — 92083 EXTENDED VISUAL FIELD XM: CPT

## 2017-08-16 PROCEDURE — 92133 CPTRZD OPH DX IMG PST SGM ON: CPT

## 2017-09-05 ENCOUNTER — ASOB RESULT (OUTPATIENT)
Age: 27
End: 2017-09-05

## 2017-09-05 ENCOUNTER — APPOINTMENT (OUTPATIENT)
Dept: ANTEPARTUM | Facility: CLINIC | Age: 27
End: 2017-09-05
Payer: MEDICAID

## 2017-09-05 PROCEDURE — 76811 OB US DETAILED SNGL FETUS: CPT

## 2017-09-07 ENCOUNTER — MEDICATION RENEWAL (OUTPATIENT)
Age: 27
End: 2017-09-07

## 2017-09-08 ENCOUNTER — RX RENEWAL (OUTPATIENT)
Age: 27
End: 2017-09-08

## 2017-09-08 RX ORDER — ENOXAPARIN SODIUM 100 MG/ML
100 INJECTION SUBCUTANEOUS TWICE DAILY
Qty: 30 | Refills: 0 | Status: ACTIVE | COMMUNITY
Start: 2017-07-07 | End: 1900-01-01

## 2017-09-18 ENCOUNTER — APPOINTMENT (OUTPATIENT)
Dept: OPHTHALMOLOGY | Facility: CLINIC | Age: 27
End: 2017-09-18
Payer: MEDICAID

## 2017-09-18 PROCEDURE — 92012 INTRM OPH EXAM EST PATIENT: CPT

## 2017-09-18 PROCEDURE — 92083 EXTENDED VISUAL FIELD XM: CPT

## 2017-09-18 PROCEDURE — 92133 CPTRZD OPH DX IMG PST SGM ON: CPT

## 2017-09-22 ENCOUNTER — APPOINTMENT (OUTPATIENT)
Dept: NEUROLOGY | Facility: CLINIC | Age: 27
End: 2017-09-22

## 2017-09-22 ENCOUNTER — EMERGENCY (EMERGENCY)
Facility: HOSPITAL | Age: 27
LOS: 1 days | Discharge: HOME | End: 2017-09-22
Attending: EMERGENCY MEDICINE | Admitting: EMERGENCY MEDICINE
Payer: MEDICAID

## 2017-09-22 VITALS
DIASTOLIC BLOOD PRESSURE: 70 MMHG | OXYGEN SATURATION: 100 % | HEART RATE: 100 BPM | SYSTOLIC BLOOD PRESSURE: 110 MMHG | RESPIRATION RATE: 18 BRPM

## 2017-09-22 VITALS
OXYGEN SATURATION: 99 % | SYSTOLIC BLOOD PRESSURE: 111 MMHG | TEMPERATURE: 98 F | RESPIRATION RATE: 20 BRPM | DIASTOLIC BLOOD PRESSURE: 69 MMHG | HEART RATE: 98 BPM

## 2017-09-22 LAB
ALBUMIN SERPL ELPH-MCNC: 3.4 G/DL — SIGNIFICANT CHANGE UP (ref 3.3–5)
ALP SERPL-CCNC: 51 U/L — SIGNIFICANT CHANGE UP (ref 40–120)
ALT FLD-CCNC: 19 U/L — SIGNIFICANT CHANGE UP (ref 4–33)
APTT BLD: 29.7 SEC — SIGNIFICANT CHANGE UP (ref 27.5–37.4)
AST SERPL-CCNC: 12 U/L — SIGNIFICANT CHANGE UP (ref 4–32)
BASOPHILS # BLD AUTO: 0.01 K/UL — SIGNIFICANT CHANGE UP (ref 0–0.2)
BASOPHILS NFR BLD AUTO: 0.1 % — SIGNIFICANT CHANGE UP (ref 0–2)
BILIRUB SERPL-MCNC: < 0.2 MG/DL — LOW (ref 0.2–1.2)
BUN SERPL-MCNC: 6 MG/DL — LOW (ref 7–23)
CALCIUM SERPL-MCNC: 8.8 MG/DL — SIGNIFICANT CHANGE UP (ref 8.4–10.5)
CHLORIDE SERPL-SCNC: 102 MMOL/L — SIGNIFICANT CHANGE UP (ref 98–107)
CO2 SERPL-SCNC: 24 MMOL/L — SIGNIFICANT CHANGE UP (ref 22–31)
CREAT SERPL-MCNC: 0.53 MG/DL — SIGNIFICANT CHANGE UP (ref 0.5–1.3)
EOSINOPHIL # BLD AUTO: 0.07 K/UL — SIGNIFICANT CHANGE UP (ref 0–0.5)
EOSINOPHIL NFR BLD AUTO: 0.8 % — SIGNIFICANT CHANGE UP (ref 0–6)
GLUCOSE SERPL-MCNC: 76 MG/DL — SIGNIFICANT CHANGE UP (ref 70–99)
HCT VFR BLD CALC: 29.9 % — LOW (ref 34.5–45)
HGB BLD-MCNC: 10.1 G/DL — LOW (ref 11.5–15.5)
IMM GRANULOCYTES # BLD AUTO: 0.05 # — SIGNIFICANT CHANGE UP
IMM GRANULOCYTES NFR BLD AUTO: 0.6 % — SIGNIFICANT CHANGE UP (ref 0–1.5)
INR BLD: 1.03 — SIGNIFICANT CHANGE UP (ref 0.88–1.17)
LYMPHOCYTES # BLD AUTO: 1.54 K/UL — SIGNIFICANT CHANGE UP (ref 1–3.3)
LYMPHOCYTES # BLD AUTO: 17.2 % — SIGNIFICANT CHANGE UP (ref 13–44)
MCHC RBC-ENTMCNC: 29.3 PG — SIGNIFICANT CHANGE UP (ref 27–34)
MCHC RBC-ENTMCNC: 33.8 % — SIGNIFICANT CHANGE UP (ref 32–36)
MCV RBC AUTO: 86.7 FL — SIGNIFICANT CHANGE UP (ref 80–100)
MONOCYTES # BLD AUTO: 1.48 K/UL — HIGH (ref 0–0.9)
MONOCYTES NFR BLD AUTO: 16.6 % — HIGH (ref 2–14)
NEUTROPHILS # BLD AUTO: 5.79 K/UL — SIGNIFICANT CHANGE UP (ref 1.8–7.4)
NEUTROPHILS NFR BLD AUTO: 64.7 % — SIGNIFICANT CHANGE UP (ref 43–77)
NRBC # FLD: 0 — SIGNIFICANT CHANGE UP
PLATELET # BLD AUTO: 208 K/UL — SIGNIFICANT CHANGE UP (ref 150–400)
PMV BLD: 10.6 FL — SIGNIFICANT CHANGE UP (ref 7–13)
POTASSIUM SERPL-MCNC: 4.4 MMOL/L — SIGNIFICANT CHANGE UP (ref 3.5–5.3)
POTASSIUM SERPL-SCNC: 4.4 MMOL/L — SIGNIFICANT CHANGE UP (ref 3.5–5.3)
PROT SERPL-MCNC: 6.8 G/DL — SIGNIFICANT CHANGE UP (ref 6–8.3)
PROTHROM AB SERPL-ACNC: 11.5 SEC — SIGNIFICANT CHANGE UP (ref 9.8–13.1)
RBC # BLD: 3.45 M/UL — LOW (ref 3.8–5.2)
RBC # FLD: 15.7 % — HIGH (ref 10.3–14.5)
SODIUM SERPL-SCNC: 139 MMOL/L — SIGNIFICANT CHANGE UP (ref 135–145)
WBC # BLD: 8.94 K/UL — SIGNIFICANT CHANGE UP (ref 3.8–10.5)
WBC # FLD AUTO: 8.94 K/UL — SIGNIFICANT CHANGE UP (ref 3.8–10.5)

## 2017-09-22 PROCEDURE — 99285 EMERGENCY DEPT VISIT HI MDM: CPT

## 2017-09-22 PROCEDURE — 70551 MRI BRAIN STEM W/O DYE: CPT | Mod: 26

## 2017-09-22 PROCEDURE — 70544 MR ANGIOGRAPHY HEAD W/O DYE: CPT | Mod: 26,59

## 2017-09-22 RX ORDER — SODIUM CHLORIDE 9 MG/ML
1000 INJECTION INTRAMUSCULAR; INTRAVENOUS; SUBCUTANEOUS ONCE
Qty: 0 | Refills: 0 | Status: COMPLETED | OUTPATIENT
Start: 2017-09-22 | End: 2017-09-22

## 2017-09-22 RX ORDER — MAGNESIUM SULFATE 500 MG/ML
2 VIAL (ML) INJECTION ONCE
Qty: 0 | Refills: 0 | Status: COMPLETED | OUTPATIENT
Start: 2017-09-22 | End: 2017-09-22

## 2017-09-22 RX ADMIN — SODIUM CHLORIDE 1000 MILLILITER(S): 9 INJECTION INTRAMUSCULAR; INTRAVENOUS; SUBCUTANEOUS at 16:46

## 2017-09-22 RX ADMIN — Medication 50 GRAM(S): at 22:28

## 2017-09-22 NOTE — ED PROVIDER NOTE - PROGRESS NOTE DETAILS
headache at baseline s/p mag, pt comfortable with discharge, will f/u with her OB and neuro, ob and neuro okay for d/c./

## 2017-09-22 NOTE — ED PROVIDER NOTE - MEDICAL DECISION MAKING DETAILS
pt with recent sinus thrombosis, mild headache, will give fluids ( pt allergic to tylenol and refusing other pain meds), will MRI + neuro consult. Pt with known sinus venous thrombosis, mild headache, will give fluids ( pt allergic to tylenol and refusing other pain meds), will MRI + neuro consult. Reassess.

## 2017-09-22 NOTE — ED PROVIDER NOTE - CRANIAL NERVE AND PUPILLARY EXAM
cranial nerves 2-12 intact extra-ocular movements intact/cranial nerves 2-12 intact/peripheral vision intact/tongue is midline

## 2017-09-22 NOTE — ED PROVIDER NOTE - ATTENDING CONTRIBUTION TO CARE
Attending Attestation: Dr. Zayas  I have personally performed a history and physical examination of the patient and discussed management with the resident as well as the patient.  I reviewed the resident's note and agree with the documented findings and plan of care.  I have authored and modified critical sections of the Provider Note, including but not limited to HPI, Physical Exam and MDM.Pt with known sinus venous thrombosis, mild headache, will give fluids ( pt allergic to tylenol and refusing other pain meds), will MRI + neuro consult. Reassess.

## 2017-09-22 NOTE — ED PROVIDER NOTE - OBJECTIVE STATEMENT
26yo  at approx 6months, recent venous sinus thrombosis on Lovenox pw headache, bilateral x 1 day, no nausea or vomiting. no numbness, tingling weakness. Pt states feels different from previous headache when she had the clot however she doesn't usually get headaches. pt also complains of right flank pain, hx of uti's during this pregnancy. 26 yo  at approx 6 months gestation, recent venous sinus thrombosis (during this preg), now on Lovenox p/w headache, bilateral x 1 day, no nausea or vomiting. no numbness, tingling weakness. Pt states feels different from previous headache when she had the clot however she doesn't usually get headaches. Pt also complains of right flank pain, hx of UTI's during this pregnancy.  DURANT has been intermittent over last few days but constant today.  No trauma.

## 2017-09-22 NOTE — ED ADULT NURSE NOTE - OBJECTIVE STATEMENT
The patient is a 28 y/o female a&ox4 presenting from Neurologists office with a c/c of HA x3 days.  Patient has hx of cerebral embolism, currently being treated with Lovenox 100meq BID.  Patient reports her headaches have been worsening over the past three days, are episodic in nature lasting only approx one minute in duration and are accompanied with blurry vision, denies other symptoms.  At present patient denies HA, denies pain and discomfort.  Patient is 6 months pregnant, reports feeling normal amount of fetal movement, denies abdominal pain, denies vaginal bleeding, abdomen non-tender to touch.  Denies edema in hands, legs and face.  Patient denies SOB, CP. N/V/D, fevers/chills, GI/ symptoms.  VSS, patient in nad, MD presently evaluating.

## 2017-09-22 NOTE — CONSULT NOTE ADULT - ASSESSMENT
Patient is a 28 yo  (approx 6mos) F with a PMH of Migraines and recent Venous Sinus Thrombosis (on Lovenox) who is presenting with a HA for the past 2 days. HA is intermittent with multiple events an hour lasting minutes at a time and associated with b/l blurry vision and very mild photophobia that last as long as the HA, no phonophobia. The HA is 6/10. Possible migraine HA vs. worsening of Venous Sinus Thrombosis.    Recommendations:  - MRI Head w/o  - MRV Head w/o  - Can consider Solumedrol for HA, however pt does not want treatment at this time

## 2017-09-22 NOTE — ED PROVIDER NOTE - PMH
11 weeks gestation of pregnancy    Migraines    Ophthalmalgia, bilateral    Photophobia, bilateral 11 weeks gestation of pregnancy    Dural sinus thrombosis    Migraines    Ophthalmalgia, bilateral    Photophobia, bilateral

## 2017-09-22 NOTE — ED ADULT TRIAGE NOTE - CHIEF COMPLAINT QUOTE
pt is 6 moths preg. coming from Neurology office for increase HA x 3 days History of head blood clot on Lovenox 100 BID self given.  denies ABD pain, no vag. bleeding, no SOB, no pedal edam + fetal movents,

## 2017-09-22 NOTE — CONSULT NOTE ADULT - SUBJECTIVE AND OBJECTIVE BOX
HPI:  Patient is a 28 yo  (approx 6mos) F with a PMH of Migraines and recent Venous Sinus Thrombosis (on Lovenox) who is presenting with a HA for the past 2 days. HA is intermittent with multiple events an hour lasting minutes at a time and associated with b/l blurry vision and very mild photophobia that last as long as the HA, no phonophobia. The HA is 6/10. Of note pt was diagnosed with Venous Sinus Thrombosis earlier in the month due to similar HAs, however they were 10/10. Pt denies any fevers, changes in hearing, numbness/tingling, weakness, CP, SOB, cough, nausea/vomiting, abdominal pain, changes in BMs/urination.      MEDICATIONS  (STANDING):    MEDICATIONS  (PRN):    PAST MEDICAL & SURGICAL HISTORY:  Ophthalmalgia, bilateral  Photophobia, bilateral  11 weeks gestation of pregnancy  Migraines  No significant past surgical history    FAMILY HISTORY:  Family history of diabetes mellitus (Grandparent)  Family history of hypertension (Grandparent): grandmother    Allergies    Benadryl (Hives)  cortisone (Hives)  pcn (Unknown)  Tylenol (Hives)  Tylenol (Urticaria)  Zofran (Anaphylaxis)  Zofran (Rash)    Intolerances        SHx - No smoking, No ETOH, No drug abuse      Review of Systems:  See HPI.      Vital Signs Last 24 Hrs  T(C): 36.7 (22 Sep 2017 15:54), Max: 36.7 (22 Sep 2017 15:16)  T(F): 98 (22 Sep 2017 15:54), Max: 98 (22 Sep 2017 15:16)  HR: 91 (22 Sep 2017 15:54) (91 - 98)  BP: 102/58 (22 Sep 2017 15:54) (102/58 - 111/69)  BP(mean): --  RR: 18 (22 Sep 2017 15:54) (18 - 20)  SpO2: 100% (22 Sep 2017 15:54) (99% - 100%)    General Exam:   General appearance: No acute distress    Neurological Exam:  Mental Status: Orientated to self, date and place. Attention intact. No dysarthria. Speech fluent.  Cranial Nerves: PERRL, EOMI, VFF, no nystagmus. Fundoscopy is limited, but unremarkable. CN V1-3 intact to light touch. No facial asymmetry.  Hearing intact to finger rub bilaterally. Tongue, uvula and palate midline. Sternocleidomastoid and Trapezius intact bilaterally.    Motor:   Tone: normal.                  Strength:     [] Upper extremity                      Delt       Bicep    Tricep                                                  R         //5        5/       5/5                                               L          /        5/        5/       5/5  [] Lower extremity                       HF          KE          KF        DF         PF                                               R        ////       5/                                               L                5/        5/  Pronator drift: none                 Dysmetria: None to finger-nose-finger b/l  No truncal ataxia.    Tremor: No resting, postural or action tremor.  No myoclonus.    Sensation: intact to light touch throughout    Deep Tendon Reflexes:              Biceps       BR       Patellar        Ankle       Babinski                                         R       2+            2+       2+               2+           downgoing                                         L        2+            2+       2+               2+           downgoing                 10.1   8.94  )-----------( 208      ( 22 Sep 2017 16:45 )             29.9

## 2017-10-19 ENCOUNTER — APPOINTMENT (OUTPATIENT)
Dept: CARDIOLOGY | Facility: CLINIC | Age: 27
End: 2017-10-19
Payer: MEDICAID

## 2017-10-19 VITALS
RESPIRATION RATE: 16 BRPM | HEART RATE: 84 BPM | OXYGEN SATURATION: 97 % | SYSTOLIC BLOOD PRESSURE: 106 MMHG | BODY MASS INDEX: 32.28 KG/M2 | HEIGHT: 68 IN | DIASTOLIC BLOOD PRESSURE: 71 MMHG | WEIGHT: 213 LBS

## 2017-10-19 DIAGNOSIS — R42 DIZZINESS AND GIDDINESS: ICD-10-CM

## 2017-10-19 DIAGNOSIS — Z51.81 ENCOUNTER FOR THERAPEUTIC DRUG LVL MONITORING: ICD-10-CM

## 2017-10-19 DIAGNOSIS — R94.31 ABNORMAL ELECTROCARDIOGRAM [ECG] [EKG]: ICD-10-CM

## 2017-10-19 DIAGNOSIS — Z79.01 ENCOUNTER FOR THERAPEUTIC DRUG LVL MONITORING: ICD-10-CM

## 2017-10-19 PROCEDURE — 93000 ELECTROCARDIOGRAM COMPLETE: CPT

## 2017-10-19 PROCEDURE — 99215 OFFICE O/P EST HI 40 MIN: CPT

## 2017-11-21 ENCOUNTER — APPOINTMENT (OUTPATIENT)
Dept: OPHTHALMOLOGY | Facility: CLINIC | Age: 27
End: 2017-11-21
Payer: MEDICAID

## 2017-11-21 PROCEDURE — 92133 CPTRZD OPH DX IMG PST SGM ON: CPT

## 2017-11-21 PROCEDURE — 92012 INTRM OPH EXAM EST PATIENT: CPT

## 2017-11-21 PROCEDURE — 92083 EXTENDED VISUAL FIELD XM: CPT

## 2017-12-20 ENCOUNTER — APPOINTMENT (OUTPATIENT)
Dept: ANTEPARTUM | Facility: CLINIC | Age: 27
End: 2017-12-20
Payer: MEDICAID

## 2017-12-20 ENCOUNTER — APPOINTMENT (OUTPATIENT)
Dept: ANTEPARTUM | Facility: CLINIC | Age: 27
End: 2017-12-20

## 2017-12-20 ENCOUNTER — ASOB RESULT (OUTPATIENT)
Age: 27
End: 2017-12-20

## 2017-12-20 PROCEDURE — 76816 OB US FOLLOW-UP PER FETUS: CPT

## 2017-12-20 PROCEDURE — 76819 FETAL BIOPHYS PROFIL W/O NST: CPT

## 2018-01-01 ENCOUNTER — OUTPATIENT (OUTPATIENT)
Dept: OUTPATIENT SERVICES | Facility: HOSPITAL | Age: 28
LOS: 1 days | End: 2018-01-01
Payer: MEDICAID

## 2018-01-01 PROCEDURE — G9001: CPT

## 2018-01-11 ENCOUNTER — APPOINTMENT (OUTPATIENT)
Dept: ANTEPARTUM | Facility: CLINIC | Age: 28
End: 2018-01-11

## 2018-01-11 ENCOUNTER — INPATIENT (INPATIENT)
Facility: HOSPITAL | Age: 28
LOS: 1 days | Discharge: HOME | End: 2018-01-13
Attending: OBSTETRICS & GYNECOLOGY | Admitting: OBSTETRICS & GYNECOLOGY
Payer: MEDICAID

## 2018-01-11 ENCOUNTER — APPOINTMENT (OUTPATIENT)
Dept: CARDIOLOGY | Facility: CLINIC | Age: 28
End: 2018-01-11

## 2018-01-11 VITALS — HEIGHT: 68 IN | WEIGHT: 222.67 LBS

## 2018-01-11 DIAGNOSIS — O26.899 OTHER SPECIFIED PREGNANCY RELATED CONDITIONS, UNSPECIFIED TRIMESTER: ICD-10-CM

## 2018-01-11 DIAGNOSIS — Z3A.00 WEEKS OF GESTATION OF PREGNANCY NOT SPECIFIED: ICD-10-CM

## 2018-01-11 LAB
ALBUMIN SERPL ELPH-MCNC: 3.5 G/DL — SIGNIFICANT CHANGE UP (ref 3.3–5)
ALP SERPL-CCNC: 117 U/L — SIGNIFICANT CHANGE UP (ref 40–120)
ALT FLD-CCNC: 51 U/L — HIGH (ref 4–33)
APTT BLD: 25.4 SEC — LOW (ref 27.5–37.4)
AST SERPL-CCNC: 25 U/L — SIGNIFICANT CHANGE UP (ref 4–32)
BASOPHILS # BLD AUTO: 0.01 K/UL — SIGNIFICANT CHANGE UP (ref 0–0.2)
BASOPHILS NFR BLD AUTO: 0.1 % — SIGNIFICANT CHANGE UP (ref 0–2)
BILIRUB SERPL-MCNC: < 0.2 MG/DL — LOW (ref 0.2–1.2)
BLD GP AB SCN SERPL QL: NEGATIVE — SIGNIFICANT CHANGE UP
BUN SERPL-MCNC: 15 MG/DL — SIGNIFICANT CHANGE UP (ref 7–23)
CALCIUM SERPL-MCNC: 9 MG/DL — SIGNIFICANT CHANGE UP (ref 8.4–10.5)
CHLORIDE SERPL-SCNC: 103 MMOL/L — SIGNIFICANT CHANGE UP (ref 98–107)
CO2 SERPL-SCNC: 22 MMOL/L — SIGNIFICANT CHANGE UP (ref 22–31)
CREAT SERPL-MCNC: 0.82 MG/DL — SIGNIFICANT CHANGE UP (ref 0.5–1.3)
EOSINOPHIL # BLD AUTO: 0.17 K/UL — SIGNIFICANT CHANGE UP (ref 0–0.5)
EOSINOPHIL NFR BLD AUTO: 1.9 % — SIGNIFICANT CHANGE UP (ref 0–6)
FIBRINOGEN PPP-MCNC: 746 MG/DL — HIGH (ref 310–510)
GLUCOSE SERPL-MCNC: 87 MG/DL — SIGNIFICANT CHANGE UP (ref 70–99)
HCT VFR BLD CALC: 36.3 % — SIGNIFICANT CHANGE UP (ref 34.5–45)
HGB BLD-MCNC: 11.7 G/DL — SIGNIFICANT CHANGE UP (ref 11.5–15.5)
IMM GRANULOCYTES # BLD AUTO: 0.07 # — SIGNIFICANT CHANGE UP
IMM GRANULOCYTES NFR BLD AUTO: 0.8 % — SIGNIFICANT CHANGE UP (ref 0–1.5)
INR BLD: 0.89 — SIGNIFICANT CHANGE UP (ref 0.88–1.17)
LDH SERPL L TO P-CCNC: 144 U/L — SIGNIFICANT CHANGE UP (ref 135–225)
LYMPHOCYTES # BLD AUTO: 2.07 K/UL — SIGNIFICANT CHANGE UP (ref 1–3.3)
LYMPHOCYTES # BLD AUTO: 23.5 % — SIGNIFICANT CHANGE UP (ref 13–44)
MCHC RBC-ENTMCNC: 27.9 PG — SIGNIFICANT CHANGE UP (ref 27–34)
MCHC RBC-ENTMCNC: 32.2 % — SIGNIFICANT CHANGE UP (ref 32–36)
MCV RBC AUTO: 86.4 FL — SIGNIFICANT CHANGE UP (ref 80–100)
MONOCYTES # BLD AUTO: 1.15 K/UL — HIGH (ref 0–0.9)
MONOCYTES NFR BLD AUTO: 13.1 % — SIGNIFICANT CHANGE UP (ref 2–14)
NEUTROPHILS # BLD AUTO: 5.34 K/UL — SIGNIFICANT CHANGE UP (ref 1.8–7.4)
NEUTROPHILS NFR BLD AUTO: 60.6 % — SIGNIFICANT CHANGE UP (ref 43–77)
NRBC # FLD: 0 — SIGNIFICANT CHANGE UP
PLATELET # BLD AUTO: 233 K/UL — SIGNIFICANT CHANGE UP (ref 150–400)
PMV BLD: 11.3 FL — SIGNIFICANT CHANGE UP (ref 7–13)
POTASSIUM SERPL-MCNC: 4 MMOL/L — SIGNIFICANT CHANGE UP (ref 3.5–5.3)
POTASSIUM SERPL-SCNC: 4 MMOL/L — SIGNIFICANT CHANGE UP (ref 3.5–5.3)
PROT SERPL-MCNC: 6.9 G/DL — SIGNIFICANT CHANGE UP (ref 6–8.3)
PROTHROM AB SERPL-ACNC: 9.8 SEC — SIGNIFICANT CHANGE UP (ref 9.8–13.1)
RBC # BLD: 4.2 M/UL — SIGNIFICANT CHANGE UP (ref 3.8–5.2)
RBC # FLD: 15.8 % — HIGH (ref 10.3–14.5)
RH IG SCN BLD-IMP: POSITIVE — SIGNIFICANT CHANGE UP
RH IG SCN BLD-IMP: POSITIVE — SIGNIFICANT CHANGE UP
SODIUM SERPL-SCNC: 140 MMOL/L — SIGNIFICANT CHANGE UP (ref 135–145)
T PALLIDUM AB TITR SER: NEGATIVE — SIGNIFICANT CHANGE UP
URATE SERPL-MCNC: 5.6 MG/DL — SIGNIFICANT CHANGE UP (ref 2.5–7)
WBC # BLD: 8.81 K/UL — SIGNIFICANT CHANGE UP (ref 3.8–10.5)
WBC # FLD AUTO: 8.81 K/UL — SIGNIFICANT CHANGE UP (ref 3.8–10.5)

## 2018-01-11 PROCEDURE — 99223 1ST HOSP IP/OBS HIGH 75: CPT

## 2018-01-11 PROCEDURE — 70545 MR ANGIOGRAPHY HEAD W/DYE: CPT | Mod: 26

## 2018-01-11 RX ORDER — OXYTOCIN 10 UNIT/ML
333.33 VIAL (ML) INJECTION
Qty: 20 | Refills: 0 | Status: COMPLETED | OUTPATIENT
Start: 2018-01-11

## 2018-01-11 RX ORDER — MAGNESIUM HYDROXIDE 400 MG/1
30 TABLET, CHEWABLE ORAL
Qty: 0 | Refills: 0 | Status: DISCONTINUED | OUTPATIENT
Start: 2018-01-12 | End: 2018-01-13

## 2018-01-11 RX ORDER — LANOLIN
1 OINTMENT (GRAM) TOPICAL EVERY 6 HOURS
Qty: 0 | Refills: 0 | Status: DISCONTINUED | OUTPATIENT
Start: 2018-01-12 | End: 2018-01-13

## 2018-01-11 RX ORDER — HEPARIN SODIUM 5000 [USP'U]/ML
5000 INJECTION INTRAVENOUS; SUBCUTANEOUS EVERY 12 HOURS
Qty: 0 | Refills: 0 | Status: DISCONTINUED | OUTPATIENT
Start: 2018-01-11 | End: 2018-01-12

## 2018-01-11 RX ORDER — SIMETHICONE 80 MG/1
80 TABLET, CHEWABLE ORAL EVERY 6 HOURS
Qty: 0 | Refills: 0 | Status: DISCONTINUED | OUTPATIENT
Start: 2018-01-12 | End: 2018-01-13

## 2018-01-11 RX ORDER — OXYCODONE HYDROCHLORIDE 5 MG/1
5 TABLET ORAL EVERY 4 HOURS
Qty: 0 | Refills: 0 | Status: DISCONTINUED | OUTPATIENT
Start: 2018-01-11 | End: 2018-01-13

## 2018-01-11 RX ORDER — FENTANYL CITRATE 50 UG/ML
20 INJECTION INTRAVENOUS
Qty: 0 | Refills: 0 | Status: DISCONTINUED | OUTPATIENT
Start: 2018-01-11 | End: 2018-01-12

## 2018-01-11 RX ORDER — DIBUCAINE 1 %
1 OINTMENT (GRAM) RECTAL EVERY 4 HOURS
Qty: 0 | Refills: 0 | Status: DISCONTINUED | OUTPATIENT
Start: 2018-01-11 | End: 2018-01-12

## 2018-01-11 RX ORDER — VANCOMYCIN HCL 1 G
1000 VIAL (EA) INTRAVENOUS EVERY 12 HOURS
Qty: 0 | Refills: 0 | Status: DISCONTINUED | OUTPATIENT
Start: 2018-01-11 | End: 2018-01-12

## 2018-01-11 RX ORDER — FENTANYL CITRATE 50 UG/ML
30 INJECTION INTRAVENOUS
Qty: 0 | Refills: 0 | Status: DISCONTINUED | OUTPATIENT
Start: 2018-01-11 | End: 2018-01-12

## 2018-01-11 RX ORDER — SODIUM CHLORIDE 9 MG/ML
3 INJECTION INTRAMUSCULAR; INTRAVENOUS; SUBCUTANEOUS EVERY 8 HOURS
Qty: 0 | Refills: 0 | Status: DISCONTINUED | OUTPATIENT
Start: 2018-01-12 | End: 2018-01-13

## 2018-01-11 RX ORDER — OXYTOCIN 10 UNIT/ML
41.67 VIAL (ML) INJECTION
Qty: 20 | Refills: 0 | Status: DISCONTINUED | OUTPATIENT
Start: 2018-01-11 | End: 2018-01-12

## 2018-01-11 RX ORDER — AMPICILLIN TRIHYDRATE 250 MG
2 CAPSULE ORAL ONCE
Qty: 0 | Refills: 0 | Status: DISCONTINUED | OUTPATIENT
Start: 2018-01-11 | End: 2018-01-11

## 2018-01-11 RX ORDER — GLYCERIN ADULT
1 SUPPOSITORY, RECTAL RECTAL AT BEDTIME
Qty: 0 | Refills: 0 | Status: DISCONTINUED | OUTPATIENT
Start: 2018-01-12 | End: 2018-01-13

## 2018-01-11 RX ORDER — IBUPROFEN 200 MG
600 TABLET ORAL EVERY 6 HOURS
Qty: 0 | Refills: 0 | Status: COMPLETED | OUTPATIENT
Start: 2018-01-11 | End: 2018-12-10

## 2018-01-11 RX ORDER — PRAMOXINE HYDROCHLORIDE 150 MG/15G
1 AEROSOL, FOAM RECTAL EVERY 4 HOURS
Qty: 0 | Refills: 0 | Status: DISCONTINUED | OUTPATIENT
Start: 2018-01-12 | End: 2018-01-13

## 2018-01-11 RX ORDER — DIBUCAINE 1 %
1 OINTMENT (GRAM) RECTAL EVERY 4 HOURS
Qty: 0 | Refills: 0 | Status: DISCONTINUED | OUTPATIENT
Start: 2018-01-12 | End: 2018-01-13

## 2018-01-11 RX ORDER — AER TRAVELER 0.5 G/1
1 SOLUTION RECTAL; TOPICAL EVERY 4 HOURS
Qty: 0 | Refills: 0 | Status: DISCONTINUED | OUTPATIENT
Start: 2018-01-11 | End: 2018-01-12

## 2018-01-11 RX ORDER — AMPICILLIN TRIHYDRATE 250 MG
1 CAPSULE ORAL EVERY 4 HOURS
Qty: 0 | Refills: 0 | Status: DISCONTINUED | OUTPATIENT
Start: 2018-01-11 | End: 2018-01-11

## 2018-01-11 RX ORDER — TETANUS TOXOID, REDUCED DIPHTHERIA TOXOID AND ACELLULAR PERTUSSIS VACCINE, ADSORBED 5; 2.5; 8; 8; 2.5 [IU]/.5ML; [IU]/.5ML; UG/.5ML; UG/.5ML; UG/.5ML
0.5 SUSPENSION INTRAMUSCULAR ONCE
Qty: 0 | Refills: 0 | Status: DISCONTINUED | OUTPATIENT
Start: 2018-01-12 | End: 2018-01-13

## 2018-01-11 RX ORDER — SODIUM CHLORIDE 9 MG/ML
1000 INJECTION, SOLUTION INTRAVENOUS ONCE
Qty: 0 | Refills: 0 | Status: DISCONTINUED | OUTPATIENT
Start: 2018-01-11 | End: 2018-01-11

## 2018-01-11 RX ORDER — SODIUM CHLORIDE 9 MG/ML
1000 INJECTION, SOLUTION INTRAVENOUS
Qty: 0 | Refills: 0 | Status: DISCONTINUED | OUTPATIENT
Start: 2018-01-11 | End: 2018-01-11

## 2018-01-11 RX ORDER — BUTORPHANOL TARTRATE 2 MG/ML
2 INJECTION, SOLUTION INTRAMUSCULAR; INTRAVENOUS ONCE
Qty: 0 | Refills: 0 | Status: DISCONTINUED | OUTPATIENT
Start: 2018-01-11 | End: 2018-01-11

## 2018-01-11 RX ORDER — OXYTOCIN 10 UNIT/ML
333.33 VIAL (ML) INJECTION
Qty: 20 | Refills: 0 | Status: DISCONTINUED | OUTPATIENT
Start: 2018-01-11 | End: 2018-01-12

## 2018-01-11 RX ORDER — DOCUSATE SODIUM 100 MG
100 CAPSULE ORAL
Qty: 0 | Refills: 0 | Status: DISCONTINUED | OUTPATIENT
Start: 2018-01-12 | End: 2018-01-13

## 2018-01-11 RX ORDER — PRAMOXINE HYDROCHLORIDE 150 MG/15G
1 AEROSOL, FOAM RECTAL EVERY 4 HOURS
Qty: 0 | Refills: 0 | Status: DISCONTINUED | OUTPATIENT
Start: 2018-01-11 | End: 2018-01-12

## 2018-01-11 RX ORDER — KETOROLAC TROMETHAMINE 30 MG/ML
30 SYRINGE (ML) INJECTION ONCE
Qty: 0 | Refills: 0 | Status: DISCONTINUED | OUTPATIENT
Start: 2018-01-11 | End: 2018-01-11

## 2018-01-11 RX ORDER — AER TRAVELER 0.5 G/1
1 SOLUTION RECTAL; TOPICAL EVERY 4 HOURS
Qty: 0 | Refills: 0 | Status: DISCONTINUED | OUTPATIENT
Start: 2018-01-12 | End: 2018-01-13

## 2018-01-11 RX ORDER — SODIUM CHLORIDE 9 MG/ML
3 INJECTION INTRAMUSCULAR; INTRAVENOUS; SUBCUTANEOUS EVERY 8 HOURS
Qty: 0 | Refills: 0 | Status: DISCONTINUED | OUTPATIENT
Start: 2018-01-11 | End: 2018-01-12

## 2018-01-11 RX ORDER — AMPICILLIN TRIHYDRATE 250 MG
CAPSULE ORAL
Qty: 0 | Refills: 0 | Status: DISCONTINUED | OUTPATIENT
Start: 2018-01-11 | End: 2018-01-11

## 2018-01-11 RX ORDER — NALOXONE HYDROCHLORIDE 4 MG/.1ML
0.1 SPRAY NASAL
Qty: 0 | Refills: 0 | Status: DISCONTINUED | OUTPATIENT
Start: 2018-01-11 | End: 2018-01-13

## 2018-01-11 RX ORDER — OXYCODONE HYDROCHLORIDE 5 MG/1
5 TABLET ORAL
Qty: 0 | Refills: 0 | Status: DISCONTINUED | OUTPATIENT
Start: 2018-01-11 | End: 2018-01-13

## 2018-01-11 RX ADMIN — HEPARIN SODIUM 5000 UNIT(S): 5000 INJECTION INTRAVENOUS; SUBCUTANEOUS at 07:35

## 2018-01-11 RX ADMIN — BUTORPHANOL TARTRATE 2 MILLIGRAM(S): 2 INJECTION, SOLUTION INTRAMUSCULAR; INTRAVENOUS at 10:30

## 2018-01-11 RX ADMIN — Medication 25 MILLIGRAM(S): at 10:12

## 2018-01-11 RX ADMIN — FENTANYL CITRATE 30 MILLILITER(S): 50 INJECTION INTRAVENOUS at 11:14

## 2018-01-11 RX ADMIN — Medication 250 MILLIGRAM(S): at 07:55

## 2018-01-11 RX ADMIN — SODIUM CHLORIDE 125 MILLILITER(S): 9 INJECTION, SOLUTION INTRAVENOUS at 07:21

## 2018-01-11 RX ADMIN — Medication 125 MILLIUNIT(S)/MIN: at 17:31

## 2018-01-11 RX ADMIN — Medication 30 MILLIGRAM(S): at 18:38

## 2018-01-11 RX ADMIN — BUTORPHANOL TARTRATE 2 MILLIGRAM(S): 2 INJECTION, SOLUTION INTRAMUSCULAR; INTRAVENOUS at 10:11

## 2018-01-11 NOTE — CONSULT NOTE ADULT - ATTENDING COMMENTS
Agree with 's plan as above. No new neurologic symptoms, no suspicion for new neurologic events. No objection to proceeding with vaginal delivery if ok from Ob standpoint. MR Imaging with MRV post delivery.

## 2018-01-11 NOTE — CONSULT NOTE ADULT - ASSESSMENT
26 yo female currently in Labor. She recieved AC for 6motnhs and stopped 6 months ago. Given that she is symptom free and received full treatment for CVST She is unlikely to have increased intracranial pressure at this time.    -No absolute neurologic contraindication to vaginal delivery  -Would avoid epidural as it can precipitate CVST  -CTV or MRV with contrast after delivery to decide on further need for AC

## 2018-01-11 NOTE — CONSULT NOTE ADULT - SUBJECTIVE AND OBJECTIVE BOX
HPI: 28 yo  at term actively in labor presents to OBGYN in labor. In  patient had headaches and was found to have transverse and sigmoid sinus thrombosis. Took Lovenox for 6months and stopped it two weeks ago and was supposed to be given heparin but never received the prescription. Has not had blurry vision or headaches since stopping lovenox. Last appt with Dr. Perez a month ago noted improved pappiledema. Last MRV in Sept showed improved thrombus vs resolved thrombus          MEDICATIONS  (STANDING):  heparin  Injectable 5000 Unit(s) SubCutaneous every 12 hours  lactated ringers Bolus 1000 milliLiter(s) IV Bolus once  lactated ringers. 1000 milliLiter(s) (125 mL/Hr) IV Continuous <Continuous>  oxytocin Infusion 333.33 milliUNIT(s)/Min (999.99 mL/Hr) IV Continuous <Continuous>  vancomycin  IVPB 1000 milliGRAM(s) IV Intermittent every 12 hours    MEDICATIONS  (PRN):    PAST MEDICAL & SURGICAL HISTORY:  Dural sinus thrombosis  Ophthalmalgia, bilateral  Photophobia, bilateral  11 weeks gestation of pregnancy  Migraines  No significant past surgical history    FAMILY HISTORY:  Family history of diabetes mellitus (Grandparent)  Family history of hypertension (Grandparent): grandmother    Allergies    Benadryl (Hives)  cortisone (Hives)  pcn (Unknown)  penicillin (Hives)  Tylenol (Hives)  Tylenol (Urticaria)  Zofran (Anaphylaxis)  Zofran (Rash)    Intolerances        SHx - No smoking, No ETOH, No drug abuse      Review of Systems:  CONSTITUTIONAL:  No weight loss, fever, chills, weakness or fatigue.  HEENT:  Eyes:  No visual loss, blurred vision, double vision or yellow sclerae. Ears, Nose, Throat:  No hearing loss, sneezing, congestion, runny nose or sore throat.  SKIN:  No rash or itching.  CARDIOVASCULAR:  No chest pain, chest pressure or chest discomfort. No palpitations or edema.  RESPIRATORY:  No shortness of breath, cough or sputum.  GASTROINTESTINAL:  No anorexia, nausea, vomiting or diarrhea. No abdominal pain or blood.  GENITOURINARY:  NO Burning on urination.   NEUROLOGICAL: See HPI  MUSCULOSKELETAL:  No muscle, back pain, joint pain or stiffness.  HEMATOLOGIC:  No anemia, bleeding or bruising.  LYMPHATICS:  No enlarged nodes. No history of splenectomy.  PSYCHIATRIC:  No history of depression or anxiety.  ENDOCRINOLOGIC:  No reports of sweating, cold or heat intolerance. No polyuria or polydipsia.  ALLERGIES:  No history of asthma, hives, eczema or rhinitis.        Vital Signs Last 24 Hrs  T(C): --  T(F): --  HR: --  BP: --  BP(mean): --  RR: --  SpO2: --    General Exam:   General appearance: No acute distress                   Neurological Exam:  Mental Status: Orientated to self, date and place.  Attention intact.  No dysarthria, aphasia or neglect.  Knowledge intact.  Registration intact.  Short and long term memory grossly intact.      Cranial Nerves: CN I - not tested.  PERRL, EOMI, VFF, no nystagmus or diplopia.  No APD.  Fundi not visualized bilaterally.  CN V1-3 intact to light touch and pinprick.  No facial asymmetry.  Hearing intact to finger rub bilaterally.  Tongue, uvula and palate midline.  Sternocleidomastoid and Trapezius intact bilaterally.    Motor:   Tone: normal.                  Strength:     [] Upper extremity                      Delt       Bicep    Tricep                                                  R         5/5        5/5        5/5       5/5                                               L          5/5        5/5        5/5       5/5  [] Lower extremity                       HF          KE          KF        DF         PF                                               R        5/5        5/5        5/5       5/5       5/5                                               L         5/5        5/5       5/5       5/5        5/5  Pronator drift: none                 Dysmetria: None to finger-nose-finger or heel-shin-heel  No truncal ataxia.    Tremor: No resting, postural or action tremor.  No myoclonus.    Sensation: intact to light touch, pinprick, vibration and proprioception    Deep Tendon Reflexes: 1+ bilateral biceps, triceps, brachioradialis, knee and ankle  Toes flexor bilaterally    Gait: normal and stable.      Other:                                      11.7   8.81  )-----------( 233      ( 2018 07:00 )             36.3       < from: MRI Head w/o Cont (17 @ 20:22) >  MR imaging of the brain is limited by patient motion. No acute or   subacute infarction.  MR venography of the intracranial circulation demonstrates loss of flow   related enhancement at the junction of the bilateral transverse and   sigmoid sinuses which may be related to artifact versus sinus stenosis   versus a combination of these.    < end of copied text >

## 2018-01-12 ENCOUNTER — TRANSCRIPTION ENCOUNTER (OUTPATIENT)
Age: 28
End: 2018-01-12

## 2018-01-12 DIAGNOSIS — R69 ILLNESS, UNSPECIFIED: ICD-10-CM

## 2018-01-12 PROCEDURE — 99233 SBSQ HOSP IP/OBS HIGH 50: CPT

## 2018-01-12 RX ORDER — IBUPROFEN 200 MG
600 TABLET ORAL EVERY 6 HOURS
Qty: 0 | Refills: 0 | Status: DISCONTINUED | OUTPATIENT
Start: 2018-01-12 | End: 2018-01-13

## 2018-01-12 RX ORDER — IBUPROFEN 200 MG
600 TABLET ORAL EVERY 6 HOURS
Qty: 0 | Refills: 0 | Status: DISCONTINUED | OUTPATIENT
Start: 2018-01-12 | End: 2018-01-12

## 2018-01-12 RX ORDER — ENOXAPARIN SODIUM 100 MG/ML
100 INJECTION SUBCUTANEOUS EVERY 12 HOURS
Qty: 0 | Refills: 0 | Status: DISCONTINUED | OUTPATIENT
Start: 2018-01-12 | End: 2018-01-13

## 2018-01-12 RX ADMIN — Medication 600 MILLIGRAM(S): at 18:46

## 2018-01-12 RX ADMIN — SODIUM CHLORIDE 3 MILLILITER(S): 9 INJECTION INTRAMUSCULAR; INTRAVENOUS; SUBCUTANEOUS at 14:00

## 2018-01-12 RX ADMIN — HEPARIN SODIUM 5000 UNIT(S): 5000 INJECTION INTRAVENOUS; SUBCUTANEOUS at 05:23

## 2018-01-12 RX ADMIN — ENOXAPARIN SODIUM 100 MILLIGRAM(S): 100 INJECTION SUBCUTANEOUS at 17:52

## 2018-01-12 RX ADMIN — Medication 1 TABLET(S): at 09:59

## 2018-01-12 RX ADMIN — Medication 600 MILLIGRAM(S): at 17:52

## 2018-01-12 RX ADMIN — Medication 600 MILLIGRAM(S): at 09:59

## 2018-01-12 RX ADMIN — Medication 600 MILLIGRAM(S): at 10:59

## 2018-01-12 NOTE — PROGRESS NOTE ADULT - ASSESSMENT
A/P: 1. PPD#1 s/p   2. Venous sinus thrombosis    Neurology input appreciated  Pt. without any symptoms  Agree with AC for 12 weeks then taper  Pt. to F/U as out patient with Stroke Neuro and Hematology (Dr. Emre Garcia [phone])  Start therapeutic dose of Lovenox today  Cont. care

## 2018-01-12 NOTE — PROGRESS NOTE ADULT - SUBJECTIVE AND OBJECTIVE BOX
OB Progress Note:  PPD#1    S: 26yo   PPD#1 s/p . PNC c/b venous sinus thrombosis in transverse and sigmoid sinus (Aug 2017). Post partum MRV preliminary report showed no interval change. Patient feels well. Pain is well controlled. She is tolerating a regular diet and passing flatus. She is voiding spontaneously, and ambulating without difficulty. Denies focal deficits, changes in vision, HA. Denies CP/SOB. Denies lightheadedness/dizziness. Denies N/V.    O:  Vitals:  Vital Signs Last 24 Hrs  T(C): 36.7 (2018 06:15), Max: 36.8 (2018 17:10)  T(F): 98.1 (2018 06:15), Max: 98.2 (2018 17:10)  HR: 67 (2018 06:15) (62 - 89)  BP: 113/77 (2018 06:15) (106/55 - 154/93)  BP(mean): --  RR: 19 (2018 06:15) (16 - 19)  SpO2: 100% (2018 06:15) (100% - 100%)    MEDICATIONS  (STANDING):  diphtheria/tetanus/pertussis (acellular) Vaccine (ADAcel) 0.5 milliLiter(s) IntraMuscular once  heparin  Injectable 5000 Unit(s) SubCutaneous every 12 hours  ibuprofen  Tablet 600 milliGRAM(s) Oral every 6 hours  ibuprofen  Tablet 600 milliGRAM(s) Oral every 6 hours  oxyCODONE    IR 5 milliGRAM(s) Oral every 3 hours  oxytocin Infusion 41.667 milliUNIT(s)/Min (125 mL/Hr) IV Continuous <Continuous>  oxytocin Infusion 333.33 milliUNIT(s)/Min (999.99 mL/Hr) IV Continuous <Continuous>  prenatal multivitamin 1 Tablet(s) Oral daily  sodium chloride 0.9% lock flush 3 milliLiter(s) IV Push every 8 hours      Labs:  Blood type: AB Positive  Rubella IgG: RPR: Negative                          11.7   8.81 >-----------< 233    (  @ 07:00 )             36.3    18 @ 07:00      140  |  103  |  15  ----------------------------<  87  4.0   |  22  |  0.82        Ca    9.0      2018 07:00    TPro  6.9  /  Alb  3.5  /  TBili  < 0.2<L>  /  DBili  x   /  AST  25  /  ALT  51<H>  /  AlkPhos  117  18 @ 07:00          Physical Exam:  General: NAD  Abdomen: soft, non-tender, non-distended, fundus firm  Vaginal: Lochia wnl  Extremities: No erythema/edema

## 2018-01-12 NOTE — LACTATION INITIAL EVALUATION - LACTATION INTERVENTIONS
assisted with deep latch and positioning  discussed  signs  of  effective  feeding and  swallowing.  discussed  compression at  breast when  nbn  stops  drinking  and  is  still sucking./initiate skin to skin/initiate hand expression routine

## 2018-01-12 NOTE — PROGRESS NOTE ADULT - ATTENDING COMMENTS
Ms. Casas is a 27 year old woman with h/o 1  in the past, she presented in  labor.   She has history of venous sinus thrombosis with no evidence of venous infarction. during this admission she was asymptomatic, she has been on therapeutic Lovenox since her prior episode.  Give that now she is completely asymptomatic; MRI Venogram shows stenotic vein with a possible partial thrombus vs arachnoid granulation, it is unclear if her prior symptoms were secondary to sinus venous thrombosis. She has no new symptoms and has completed 6 months of anticoagulation. No h/o suggestive of primary hypercoagulable disorder.   hence overall risk of long term anticoagulation might outweigh the benefit.  Sicne she has been on Lovenox and now on post partum period,  I recommend she continue Lovenox for 12 weeks then taper off.  follow up with Hematology and Stroke Neurology as outpatient. Stroke education provided and patient acknowledges the understanding of care plan.

## 2018-01-12 NOTE — PROGRESS NOTE ADULT - SUBJECTIVE AND OBJECTIVE BOX
OB Attending: PPD# 1 s/p , full note to follow    Patient evaluated at bedside.   Patient's pain is well controlled with  Patient denies headache, dizziness, chest pain, palpitations, shortness of breath, nausea, vomiting, heavy vaginal bleeding or perineal discomfort.  Patient has been ambulating without assistance, voiding spontaneously, tolerating diet, and is breastfeeding.    Physical Exam:  Vital Signs Last 24 Hrs  T(C): 36.7 (2018 06:15), Max: 36.8 (2018 17:10)  T(F): 98.1 (2018 06:15), Max: 98.2 (2018 17:10)  HR: 67 (2018 06:15) (62 - 89)  BP: 113/77 (2018 06:15) (106/55 - 154/93)  BP(mean): --  RR: 19 (2018 06:15) (16 - 19)  SpO2: 100% (2018 06:15) (100% - 100%)  I&O's Summary    2018 07:01  -  2018 07:00  --------------------------------------------------------  IN: 2175 mL / OUT: 1250 mL / NET: 925 mL        NAD, A+0 x 3  Breasts: soft, nontender, no palpable masses, nipples intact and everted  Abd:  soft, nontender, nondistended, no rebound or guarding, uterus firm at midline,   : lochia WNL  Extremities: no edema or calf tenderness bilaterally                        11.7   8.81  )-----------( 233      ( 2018 07:00 )             36.3     -11    140  |  103  |  15  ----------------------------<  87  4.0   |  22  |  0.82    Ca    9.0      2018 07:00    TPro  6.9  /  Alb  3.5  /  TBili  < 0.2<L>  /  DBili  x   /  AST  25  /  ALT  51<H>  /  AlkPhos  117      PT/INR - ( 2018 07:00 )   PT: 9.8 SEC;   INR: 0.89          PTT - ( 2018 07:00 )  PTT:25.4 SEC      Assessment:  stable postpartum  labs wnl  breastfeeding well    Plan:  encourage ambulation and po fluids  Encourage breastfeeding  anticipate discharge home tomorrow OB Attending: PPD# 1 s/p , Venous Sinus Thrombosis    Patient was seen and examined at bedside.   Patient denies headache, dizziness, chest pain, palpitations, shortness of breath, nausea, vomiting, heavy vaginal bleeding or perineal discomfort.  Patient has been ambulating without assistance, voiding spontaneously, tolerating diet, and is breastfeeding.    Physical Exam:  Vital Signs Last 24 Hrs  T(C): 36.7 (2018 06:15), Max: 36.8 (2018 17:10)  T(F): 98.1 (2018 06:15), Max: 98.2 (2018 17:10)  HR: 67 (2018 06:15) (62 - 89)  BP: 113/77 (2018 06:15) (106/55 - 154/93)  BP(mean): --  RR: 19 (2018 06:15) (16 - 19)  SpO2: 100% (2018 06:15) (100% - 100%)  I&O's Summary    2018 07:01  -  2018 07:00  --------------------------------------------------------  IN: 2175 mL / OUT: 1250 mL / NET: 925 mL        NAD, A+0 x 3  Breasts: soft, nontender, no palpable masses, nipples intact and everted  CVS: Positive S1S2, RRR  Lungs: CTA B/L, No W/R/R  Abd:  soft, nontender, nondistended, no rebound or guarding, uterus firm at midline,   : lochia WNL  Extremities: no edema or calf tenderness bilaterally                        11.7   8.81  )-----------( 233      ( 2018 07:00 )             36.3     -11    140  |  103  |  15  ----------------------------<  87  4.0   |  22  |  0.82    Ca    9.0      2018 07:00    TPro  6.9  /  Alb  3.5  /  TBili  < 0.2<L>  /  DBili  x   /  AST  25  /  ALT  51<H>  /  AlkPhos  117      PT/INR - ( 2018 07:00 )   PT: 9.8 SEC;   INR: 0.89          PTT - ( 2018 07:00 )  PTT:25.4 SEC      MRV     from: MR Venogram Head w/ IV Cont (18 @ 20:09) >  IMPRESSION: Improved patency of the right transverse/sigmoid sinus with a   small focus of residual thrombus versus an arachnoid granulation.    Hypoplastic left transverse/sigmoid sinus with presumed stenosis and a   small filling defect which may represent a residual small thrombus or   arachnoid granulation.    No other evidence of venous sinus thrombosis.

## 2018-01-12 NOTE — PROGRESS NOTE ADULT - ASSESSMENT
A/P: 26yo   PPD#1 s/p . PNC c/b venous sinus thrombosis in transverse and sigmoid sinus (Aug 2017). Post partum MRV preliminary report showed no interval change. Patient is stable and doing well post-partum.

## 2018-01-12 NOTE — PROGRESS NOTE ADULT - PROBLEM SELECTOR PLAN 1
- Currently on HSQ 5000 BID  - Appreciate neurology recommendations regarding PP anticoagulation.  - Continue to monitor VS and assess for acute neurological changes.  - Pain well controlled, continue current pain regimen  - Increase ambulation, SCDs when not ambulating  - Continue regular diet    Anisa Hensley PGY-1

## 2018-01-12 NOTE — LACTATION INITIAL EVALUATION - INTERVENTION OUTCOME
demonstrates understanding of teaching/nbn demonstrated  deep latch and  performed  with sucking and swallowing  noted   ,  instructed  to offer both  breast at a feeding ,feed on cue and safe  skin to skin. ,  pt instructed to notify pediatrician if nbn not feeding 8-12 times/24 hours and not voiding and stooling according to breastfeeding log.  ./verbalizes understanding

## 2018-01-12 NOTE — CHART NOTE - NSCHARTNOTEFT_GEN_A_CORE
Spoke w neuro, MFM, attending.    Per neuro, pt w no thrombosis on MRI, not likely thrombosis but stenotic on prior read.  No ACs necessary.  Pt to follow-up w neuro and heme as outpt.    MFM aware.  Attending ghazala w neuro and MFM recommendations.    NIEVES Colin, PGY1

## 2018-01-12 NOTE — PROGRESS NOTE ADULT - SUBJECTIVE AND OBJECTIVE BOX
Subjective:Interval History - No events overnight    Objective:   Vital Signs Last 24 Hrs  T(C): 36.9 (12 Jan 2018 10:00), Max: 36.9 (12 Jan 2018 10:00)  T(F): 98.4 (12 Jan 2018 10:00), Max: 98.4 (12 Jan 2018 10:00)  HR: 76 (12 Jan 2018 10:00) (62 - 89)  BP: 104/71 (12 Jan 2018 10:00) (104/71 - 154/93)  BP(mean): --  RR: 17 (12 Jan 2018 10:00) (16 - 19)  SpO2: 100% (12 Jan 2018 10:00) (100% - 100%)    General Exam:   General appearance: No acute distress                   Neurological Exam:  Mental Status: Orientated to self, date and place.  Attention intact.  No dysarthria, aphasia or neglect.  Knowledge intact.  Registration intact.  Short and long term memory grossly intact.      Cranial Nerves: CN I - not tested.  PERRL, EOMI, VFF, no nystagmus or diplopia.  No APD.  Fundi not visualized bilaterally.  CN V1-3 intact to light touch and pinprick.  No facial asymmetry.  Hearing intact to finger rub bilaterally.  Tongue, uvula and palate midline.  Sternocleidomastoid and Trapezius intact bilaterally.    Motor:   Tone: normal.                  Strength: intact throughout  Pronator drift: none                   Cerebellum:  Dysmetria: None to finger-nose-finger or heel-shin-heel  No truncal ataxia.    Tremor: No resting, postural or action tremor.  No myoclonus.    Sensation: intact to light touch, pinprick, vibration and proprioception    Deep Tendon Reflexes: 1+ bilateral biceps, triceps, brachioradialis, knee and ankle  Toes flexor bilaterally    Gait: normal and stable.      Other:    01-11    140  |  103  |  15  ----------------------------<  87  4.0   |  22  |  0.82    Ca    9.0      11 Jan 2018 07:00    TPro  6.9  /  Alb  3.5  /  TBili  < 0.2<L>  /  DBili  x   /  AST  25  /  ALT  51<H>  /  AlkPhos  117  01-11    LIVER FUNCTIONS - ( 11 Jan 2018 07:00 )  Alb: 3.5 g/dL / Pro: 6.9 g/dL / ALK PHOS: 117 u/L / ALT: 51 u/L / AST: 25 u/L / GGT: x                                 11.7   8.81  )-----------( 233      ( 11 Jan 2018 07:00 )             36.3       MEDICATIONS  (STANDING):  diphtheria/tetanus/pertussis (acellular) Vaccine (ADAcel) 0.5 milliLiter(s) IntraMuscular once  ibuprofen  Tablet 600 milliGRAM(s) Oral every 6 hours  oxyCODONE    IR 5 milliGRAM(s) Oral every 3 hours  oxytocin Infusion 41.667 milliUNIT(s)/Min (125 mL/Hr) IV Continuous <Continuous>  oxytocin Infusion 333.33 milliUNIT(s)/Min (999.99 mL/Hr) IV Continuous <Continuous>  prenatal multivitamin 1 Tablet(s) Oral daily  sodium chloride 0.9% lock flush 3 milliLiter(s) IV Push every 8 hours    MEDICATIONS  (PRN):  dibucaine 1% Ointment 1 Application(s) Topical every 4 hours PRN Perineal Discomfort  docusate sodium 100 milliGRAM(s) Oral two times a day PRN Stool Softening  glycerin Suppository - Adult 1 Suppository(s) Rectal at bedtime PRN Constipation  lanolin Ointment 1 Application(s) Topical every 6 hours PRN Sore Nipples  magnesium hydroxide Suspension 30 milliLiter(s) Oral two times a day PRN Constipation  naloxone Injectable 0.1 milliGRAM(s) IV Push every 3 minutes PRN For ANY of the following changes in patient status:  A. RR LESS THAN 10 breaths per minute, B. Oxygen saturation LESS THAN 90%, C. Sedation score of 6  oxyCODONE    IR 5 milliGRAM(s) Oral every 4 hours PRN Severe Pain (7 -10)  pramoxine 1%/zinc 5% Cream 1 Application(s) Topical every 4 hours PRN Moderate to Severe Perineal Pain  simethicone 80 milliGRAM(s) Chew every 6 hours PRN Gas  witch hazel Pads 1 Application(s) Topical every 4 hours PRN Perineal Discomfort    MRV   < from: MR Venogram Head w/ IV Cont (01.11.18 @ 20:09) >  IMPRESSION: Improved patency of the right transverse/sigmoid sinus with a   small focus of residual thrombus versus an arachnoid granulation.    Hypoplastic left transverse/sigmoid sinus with presumed stenosis and a   small filling defect which may represent a residual small thrombus or   arachnoid granulation.    No other evidence of venous sinus thrombosis.    < end of copied text >

## 2018-01-13 VITALS
HEART RATE: 60 BPM | OXYGEN SATURATION: 100 % | DIASTOLIC BLOOD PRESSURE: 66 MMHG | TEMPERATURE: 98 F | SYSTOLIC BLOOD PRESSURE: 119 MMHG | RESPIRATION RATE: 18 BRPM

## 2018-01-13 DIAGNOSIS — G08 INTRACRANIAL AND INTRASPINAL PHLEBITIS AND THROMBOPHLEBITIS: ICD-10-CM

## 2018-01-13 RX ORDER — IBUPROFEN 200 MG
1 TABLET ORAL
Qty: 28 | Refills: 0 | OUTPATIENT
Start: 2018-01-13 | End: 2018-01-19

## 2018-01-13 RX ORDER — ENOXAPARIN SODIUM 100 MG/ML
100 INJECTION SUBCUTANEOUS
Qty: 60 | Refills: 3 | OUTPATIENT
Start: 2018-01-13 | End: 2018-05-12

## 2018-01-13 RX ADMIN — Medication 600 MILLIGRAM(S): at 02:03

## 2018-01-13 RX ADMIN — Medication 600 MILLIGRAM(S): at 10:48

## 2018-01-13 RX ADMIN — Medication 600 MILLIGRAM(S): at 10:14

## 2018-01-13 RX ADMIN — Medication 600 MILLIGRAM(S): at 01:33

## 2018-01-13 RX ADMIN — Medication 1 TABLET(S): at 10:14

## 2018-01-13 RX ADMIN — ENOXAPARIN SODIUM 100 MILLIGRAM(S): 100 INJECTION SUBCUTANEOUS at 06:03

## 2018-01-13 NOTE — DISCHARGE NOTE OB - CARE PROVIDER_API CALL
Kohanim, Behnam (MD), Obstetrics and Gynecology  [address] [facility]  [address] [place] [address]  Phone: [phone]  Fax: [phone]

## 2018-01-13 NOTE — PROGRESS NOTE ADULT - SUBJECTIVE AND OBJECTIVE BOX
PPD #2  Patient evaluated at bedside.   Patient's pain is well controlled   Patient denies headache, dizziness, chest pain, palpitations, shortness of breath, nausea, vomiting, heavy vaginal bleeding or perineal discomfort.  Patient has been ambulating without assistance, voiding spontaneously, tolerating diet    HEAD MRI REPORT REVIEWED---PER NEURO TO DC HOME ON LOVENOX 100 MG BID AND TO F/U NEURO OUTPATIENT    BLEEDING PRECAUTIONS GIVEN    Physical Exam:  Vital Signs Last 24 Hrs  T(C): 36.6 (13 Jan 2018 06:20), Max: 36.9 (12 Jan 2018 17:42)  T(F): 97.9 (13 Jan 2018 06:20), Max: 98.4 (12 Jan 2018 17:42)  HR: 60 (13 Jan 2018 06:20) (60 - 75)  BP: 119/66 (13 Jan 2018 06:20) (113/66 - 119/66)  BP(mean): --  RR: 18 (13 Jan 2018 06:20) (17 - 18)  SpO2: 100% (13 Jan 2018 06:20) (100% - 100%)  I&O's Summary    NAD, A+0 x 3    lochia WNL                        11.7   8.81  )-----------( 233      ( 11 Jan 2018 07:00 )             36.3               Assessment:  stable postpartum  labs wnl  Plan:  encourage ambulation and po fluids  Encourage breastfeeding  discharge home

## 2018-01-13 NOTE — PROGRESS NOTE ADULT - PROBLEM SELECTOR PLAN 2
- appreciate neuro recs  - continue therapeutic lovenox 100 BID for 12wk pp  - f/u neuro outpatient  - unchanged MRV    Elva Mathur MD PGY1

## 2018-01-13 NOTE — DISCHARGE NOTE OB - PLAN OF CARE
Full recovery Regular diet, activity as tolerated  No heavy lifting and pelvic rest for 6 weeks Continue Lovenox 100 mg BID x 3 months then taper per neurology  F/U with Stroke Neuro Dr. Emre Garcia in 1 to 2 weeks   F/U with Hematology in 2 weeks

## 2018-01-13 NOTE — DISCHARGE NOTE OB - PATIENT PORTAL LINK FT
“You can access the [employer] Patient Portal, offered by [facility], by registering with the following website: [email or web address]”

## 2018-01-13 NOTE — DISCHARGE NOTE OB - INCREASED VAGINAL BLEEDING OR LARGE CLOTS (SATURATING A PAD AN HOUR)
Subjective:      History was provided by the father and patient was brought in for Cough; Nasal Congestion; and Fever  .    History of Present Illness:         Magalys presents today for evaluation for fever for 5 days.  Her last fever was last night.  Her temperatures have been going up to 102-103.  She has had cough, runny nose and congestion.  Parents have been giving Tylenol and Motrin prn for fever.  No rashes.  No complaints of pain.  She has had slight decrease in appetite, but is drinking well and urinating normally.  She attends .  She was seen four days ago for fever and had a negative flu, strep screen, and throat culture.    HPI    Review of Systems   Constitutional: Positive for appetite change and fever.   HENT: Positive for congestion and rhinorrhea.    Respiratory: Positive for cough.    Gastrointestinal: Negative for diarrhea and vomiting.   Genitourinary: Negative for decreased urine volume.   Skin: Negative for rash.       Objective:     Physical Exam   Constitutional: She appears well-developed and well-nourished. No distress.   HENT:   Right Ear: Tympanic membrane normal.   Left Ear: Tympanic membrane normal.   Nose: Nasal discharge (purulent, copious) and congestion present.   Mouth/Throat: Mucous membranes are moist. Oropharynx is clear. Pharynx is normal.   Eyes: Conjunctivae and EOM are normal. Pupils are equal, round, and reactive to light.   Neck: Normal range of motion. Neck supple. No rigidity.   Cardiovascular: Normal rate, regular rhythm, S1 normal and S2 normal.  Pulses are palpable.    Pulmonary/Chest: Effort normal and breath sounds normal. No nasal flaring or stridor. No respiratory distress. She has no wheezes. She has no rhonchi. She has no rales. She exhibits no retraction.   Abdominal: Soft. Bowel sounds are normal. There is no hepatosplenomegaly.   Lymphadenopathy: No occipital adenopathy is present.     She has cervical adenopathy (bilateral anterior).   Neurological:  She is alert.   Skin: Skin is warm. Capillary refill takes less than 3 seconds. She is not diaphoretic.   Nursing note and vitals reviewed.      Assessment:        1. Sinusitis in pediatric patient         Plan:   1. Sinusitis in pediatric patient  - amoxicillin-clavulanate (AUGMENTIN) 600-42.9 mg/5 mL SusR; Take 5 mLs (600 mg total) by mouth 2 (two) times daily.  Dispense: 100 mL; Refill: 0     Patient Instructions   -Give Augmentin twice daily for 10 days to treat your child's sinus infection.  Be sure to complete the entire course and do not keep any medication left over.  -Give Tylenol every 4 hours or Motrin every 6 hours as needed for fever/pain.  -Use nasal saline as needed for congestion/runny nose.  -You may use a cool mist humidifier in your child's room.  -Elevate the head of your child's bed.  -Give a probiotic, like Culturelle for Kids, to prevent diarrhea while on antibiotics.  -Contact Clinic if your child's symptoms worsen or fail to improve over the next 72 hours, or with any other concerns.         Statement Selected

## 2018-01-13 NOTE — PROVIDER CONTACT NOTE (OTHER) - ACTION/TREATMENT ORDERED:
Spoke w/Dr. Junior, Neurology re pt. being dc'd today.  Dr. Junior stated pt. ok for dc as far as Neuro was concerned.  To continue Lovenox x 12 wks and to follow up w/Neuro and Hematology as outpt.

## 2018-01-13 NOTE — PROGRESS NOTE ADULT - SUBJECTIVE AND OBJECTIVE BOX
Patient seen and examined at bedside, no acute overnight events. No acute complaints, pain well controlled. Patient is ambulating, voiding spontaneously, passing gas, and tolerating regular diet.    Vital Signs Last 24 Hours  T(C): 36.6 (01-13-18 @ 06:20), Max: 36.9 (01-12-18 @ 10:00)  HR: 60 (01-13-18 @ 06:20) (60 - 76)  BP: 119/66 (01-13-18 @ 06:20) (104/71 - 119/66)  RR: 18 (01-13-18 @ 06:20) (17 - 18)  SpO2: 100% (01-13-18 @ 06:20) (100% - 100%)    Physical Exam:  General: NAD  Abdomen: soft, non-tender, non-distended, fundus firm  Pelvic: lochia wnl    Labs:  Blood Type: AB Positive  Antibody Screen: --  RPR: Negative               11.7   8.81  )-----------( 233      ( 01-11 @ 07:00 )             36.3         MEDICATIONS  (STANDING):  diphtheria/tetanus/pertussis (acellular) Vaccine (ADAcel) 0.5 milliLiter(s) IntraMuscular once  enoxaparin Injectable 100 milliGRAM(s) SubCutaneous every 12 hours  ibuprofen  Tablet 600 milliGRAM(s) Oral every 6 hours  oxyCODONE    IR 5 milliGRAM(s) Oral every 3 hours  prenatal multivitamin 1 Tablet(s) Oral daily  sodium chloride 0.9% lock flush 3 milliLiter(s) IV Push every 8 hours    MEDICATIONS  (PRN):  dibucaine 1% Ointment 1 Application(s) Topical every 4 hours PRN Perineal Discomfort  docusate sodium 100 milliGRAM(s) Oral two times a day PRN Stool Softening  glycerin Suppository - Adult 1 Suppository(s) Rectal at bedtime PRN Constipation  lanolin Ointment 1 Application(s) Topical every 6 hours PRN Sore Nipples  magnesium hydroxide Suspension 30 milliLiter(s) Oral two times a day PRN Constipation  naloxone Injectable 0.1 milliGRAM(s) IV Push every 3 minutes PRN For ANY of the following changes in patient status:  A. RR LESS THAN 10 breaths per minute, B. Oxygen saturation LESS THAN 90%, C. Sedation score of 6  oxyCODONE    IR 5 milliGRAM(s) Oral every 4 hours PRN Severe Pain (7 -10)  pramoxine 1%/zinc 5% Cream 1 Application(s) Topical every 4 hours PRN Moderate to Severe Perineal Pain  simethicone 80 milliGRAM(s) Chew every 6 hours PRN Gas  witch hazel Pads 1 Application(s) Topical every 4 hours PRN Perineal Discomfort

## 2018-01-13 NOTE — DISCHARGE NOTE OB - MEDICATION SUMMARY - MEDICATIONS TO TAKE
I will START or STAY ON the medications listed below when I get home from the hospital:     mg oral tablet  -- 1 tab(s) by mouth 4 times a day, As Needed   -- Do not take this drug if you are pregnant.  It is very important that you take or use this exactly as directed.  Do not skip doses or discontinue unless directed by your doctor.  May cause drowsiness or dizziness.  Obtain medical advice before taking any non-prescription drugs as some may affect the action of this medication.  Take with food or milk.    -- Indication: For  (normal spontaneous vaginal delivery)    enoxaparin 100 mg/mL injectable solution  -- 100 milligram(s) subcutaneously 2 times a day MDD:2  -- It is very important that you take or use this exactly as directed.  Do not skip doses or discontinue unless directed by your doctor.    -- Indication: For Thrombosis

## 2018-01-13 NOTE — DISCHARGE NOTE OB - MATERIALS PROVIDED
Vaccinations/Breastfeeding Mother’s Support Group Information/[facility] Hearing Screen Program/Discharge Medication Information for Patients and Families Pocket Guide/Back To Sleep Handout/Breastfeeding Guide and Packet/[facility]  Screening Program/Breastfeeding Log/Shaken Baby Prevention Handout

## 2018-01-13 NOTE — PROGRESS NOTE ADULT - ASSESSMENT
28yo   PPD#2 s/p . PNC c/b venous sinus thrombosis in transverse and sigmoid sinus (Aug 2017). Post partum MRV preliminary report showed no interval change. Patient is stable and doing well post-partum.

## 2018-01-13 NOTE — PROGRESS NOTE ADULT - PROBLEM SELECTOR PLAN 1
- continue with po analgesia  - increase ambulation  - SCDs when in bed  - encourage incentive spirometry  - continue regular diet  - IV lock  - no labs

## 2018-01-13 NOTE — DISCHARGE NOTE OB - HOSPITAL COURSE
25 yo  at 39+ weeks IUP who was diagnosed with Dural Venous Sinus Thr  sis  admitted for labor at term. S 25 yo  at 38+5/7 weeks IUP who was diagnosed with Dural Venous Sinus Thrombosis during the early pregnancy due to Migraine with Aura, Papilledema  who was on therapeutic dose of Lovenox 100 mg BID,  admitted for labor at term. She was cleared by Neurology for vaginal delivery. Delivered a live female via normal vaginal delivery on 18. Apgar was 9/9 and weight was 2595 gms. Her postpartum course was unremarkable. She was started on Lovenox 100 mg BID for 3 months then taper per Neurology.  She should F/U with Stroke Neuro Dr. Emre Garcia in 1 to 2 weeks  She should F/U with Hematology in 2 weeks

## 2018-01-13 NOTE — DISCHARGE NOTE OB - CARE PLAN
Principal Discharge DX:	 (normal spontaneous vaginal delivery)  Goal:	Full recovery  Instructions for follow-up, activity and diet:	Regular diet, activity as tolerated  No heavy lifting and pelvic rest for 6 weeks  Secondary Diagnosis:	Dural sinus thrombosis  Goal:	Full recovery  Instructions for follow-up, activity and diet:	Continue Lovenox 100 mg BID x 3 months then taper per neurology  F/U with Stroke Neuro Dr. Emre Garcia in 1 to 2 weeks   F/U with Hematology in 2 weeks  Secondary Diagnosis:	Migraine without aura and without status migrainosus, not intractable

## 2018-02-27 ENCOUNTER — APPOINTMENT (OUTPATIENT)
Dept: OPHTHALMOLOGY | Facility: CLINIC | Age: 28
End: 2018-02-27
Payer: MEDICAID

## 2018-02-27 PROCEDURE — 92083 EXTENDED VISUAL FIELD XM: CPT

## 2018-02-27 PROCEDURE — 92012 INTRM OPH EXAM EST PATIENT: CPT

## 2018-02-27 PROCEDURE — 92133 CPTRZD OPH DX IMG PST SGM ON: CPT

## 2018-05-01 ENCOUNTER — OUTPATIENT (OUTPATIENT)
Dept: OUTPATIENT SERVICES | Facility: HOSPITAL | Age: 28
LOS: 1 days | End: 2018-05-01
Payer: MEDICAID

## 2018-05-01 PROCEDURE — G9001: CPT

## 2018-05-07 DIAGNOSIS — R69 ILLNESS, UNSPECIFIED: ICD-10-CM

## 2018-06-12 ENCOUNTER — APPOINTMENT (OUTPATIENT)
Dept: NEUROLOGY | Facility: CLINIC | Age: 28
End: 2018-06-12

## 2018-08-28 ENCOUNTER — APPOINTMENT (OUTPATIENT)
Dept: OPHTHALMOLOGY | Facility: CLINIC | Age: 28
End: 2018-08-28

## 2018-11-07 ENCOUNTER — EMERGENCY (EMERGENCY)
Facility: HOSPITAL | Age: 28
LOS: 1 days | Discharge: HOME | End: 2018-11-07
Attending: EMERGENCY MEDICINE | Admitting: EMERGENCY MEDICINE
Payer: MEDICAID

## 2018-11-07 VITALS
HEART RATE: 103 BPM | SYSTOLIC BLOOD PRESSURE: 150 MMHG | TEMPERATURE: 98 F | DIASTOLIC BLOOD PRESSURE: 90 MMHG | OXYGEN SATURATION: 100 % | RESPIRATION RATE: 20 BRPM

## 2018-11-07 VITALS
HEART RATE: 85 BPM | OXYGEN SATURATION: 98 % | DIASTOLIC BLOOD PRESSURE: 74 MMHG | RESPIRATION RATE: 18 BRPM | TEMPERATURE: 99 F | SYSTOLIC BLOOD PRESSURE: 106 MMHG

## 2018-11-07 PROBLEM — G08 INTRACRANIAL AND INTRASPINAL PHLEBITIS AND THROMBOPHLEBITIS: Chronic | Status: ACTIVE | Noted: 2017-09-22

## 2018-11-07 PROBLEM — H53.143 VISUAL DISCOMFORT, BILATERAL: Chronic | Status: ACTIVE | Noted: 2017-06-29

## 2018-11-07 PROBLEM — G43.909 MIGRAINE, UNSPECIFIED, NOT INTRACTABLE, WITHOUT STATUS MIGRAINOSUS: Chronic | Status: ACTIVE | Noted: 2017-06-05

## 2018-11-07 PROBLEM — H57.13 OCULAR PAIN, BILATERAL: Chronic | Status: ACTIVE | Noted: 2017-06-29

## 2018-11-07 PROCEDURE — 99218: CPT

## 2018-11-07 RX ORDER — FAMOTIDINE 10 MG/ML
1 INJECTION INTRAVENOUS
Qty: 8 | Refills: 0 | OUTPATIENT
Start: 2018-11-07 | End: 2018-11-10

## 2018-11-07 RX ORDER — FAMOTIDINE 10 MG/ML
20 INJECTION INTRAVENOUS ONCE
Qty: 0 | Refills: 0 | Status: COMPLETED | OUTPATIENT
Start: 2018-11-07 | End: 2018-11-07

## 2018-11-07 RX ORDER — HYDROXYZINE HCL 10 MG
25 TABLET ORAL EVERY 6 HOURS
Qty: 0 | Refills: 0 | Status: DISCONTINUED | OUTPATIENT
Start: 2018-11-07 | End: 2018-11-11

## 2018-11-07 RX ORDER — EPINEPHRINE 0.3 MG/.3ML
1 INJECTION INTRAMUSCULAR; SUBCUTANEOUS
Qty: 1 | Refills: 0 | OUTPATIENT
Start: 2018-11-07 | End: 2018-11-07

## 2018-11-07 RX ORDER — EPINEPHRINE 0.3 MG/.3ML
0.3 INJECTION INTRAMUSCULAR; SUBCUTANEOUS ONCE
Qty: 0 | Refills: 0 | Status: COMPLETED | OUTPATIENT
Start: 2018-11-07 | End: 2018-11-07

## 2018-11-07 RX ORDER — ALBUTEROL 90 UG/1
2.5 AEROSOL, METERED ORAL ONCE
Qty: 0 | Refills: 0 | Status: COMPLETED | OUTPATIENT
Start: 2018-11-07 | End: 2018-11-07

## 2018-11-07 RX ORDER — HYDROXYZINE HCL 10 MG
25 TABLET ORAL ONCE
Qty: 0 | Refills: 0 | Status: COMPLETED | OUTPATIENT
Start: 2018-11-07 | End: 2018-11-07

## 2018-11-07 RX ORDER — HYDROXYZINE HCL 10 MG
1 TABLET ORAL
Qty: 4 | Refills: 0 | OUTPATIENT
Start: 2018-11-07 | End: 2018-11-08

## 2018-11-07 RX ADMIN — Medication 40 MILLIGRAM(S): at 09:41

## 2018-11-07 RX ADMIN — Medication 25 MILLIGRAM(S): at 11:16

## 2018-11-07 RX ADMIN — ALBUTEROL 2.5 MILLIGRAM(S): 90 AEROSOL, METERED ORAL at 09:41

## 2018-11-07 RX ADMIN — FAMOTIDINE 20 MILLIGRAM(S): 10 INJECTION INTRAVENOUS at 09:41

## 2018-11-07 RX ADMIN — EPINEPHRINE 0.3 MILLIGRAM(S): 0.3 INJECTION INTRAMUSCULAR; SUBCUTANEOUS at 09:41

## 2018-11-07 NOTE — ED CDU PROVIDER INITIAL DAY NOTE - PHYSICAL EXAMINATION
VS: mild tachycardia, htn    GEN - NAD; pruritis; A+O x3   HEAD - NC/AT     ENT - PEERL, EOMI, mucous membranes  moist , no discharge      NECK: Neck supple, non-tender without lymphadenopathy, no masses, no JVD  PULM - CTA b/l,  symmetric breath sounds  COR -  normal heart sounds    ABD - , ND, NT, soft,  BACK - no CVA tenderness, nontender spine     EXTREMS - no edema, no deformity, warm and well perfused    SKIN - no bruising   Scattered hives to L arm, low back.   NEUROLOGIC - alert, CN 2-12 intact, sensation nl, motor no focal deficit.

## 2018-11-07 NOTE — ED CDU PROVIDER DISPOSITION NOTE - PRINCIPAL DIAGNOSIS
Verified Results  (Q) STEPWISE, PART 1 [id number] 09:43AM Huseyin Hurst   REPORT COMMENT:  AN UPDATE OR CORRECTION HAS BEEN MADE TO NAME     Test Name Result Flag Reference   INTERPRETATION SEE NOTE     This patient's risk does not exceed the first trimester  cut-off for Down syndrome or trisomy 18  The integrated  screen calculation is awaiting the second trimester sample  NT WAS USED IN THE RISK CALCULATIONS  Thank you for submitting this patient's Part 1 specimen  These first trimester values will be incorporated with the  second trimester values as part of the integrated testing  process  Please submit the Part 2 specimen between   01/14/2017-03/10/2017 (15 0 and 22 9 weeks gestation) with   01/14/2017-01/27/2017 (15 0 - 16 9 weeks gestation) being  optimal  When submitting Part 2, please include the  following Specimen # from Part 1:  [id number]   AGE RISK DOWN SYNDROME 1:810     MONICA DOWN SYNDROME RISK 1:780  <1:50   RISK FOR TRISOMY 18 <1:5000  <1:100   CALCULATED GESTATIONAL$AGE 13 6     Crown rump length (CRL) was used to calculate gestational  age  CLARITA, if provided, was not used for gestational age  dating  PAULO-A 2485 3 ng/mL     This test was performed using a kit that has not been  cleared or approved by the FDA  The analytical performance  characteristics of this test have been determined by [facility]  This test  should not be used for diagnosis without confirmation by  other medically established means  PAULO-A MOM 1 53      4 IU/mL     HCG MOM 1 60     NT MOM 1 52     The maternal serum screening results indicate a lower risk  of trisomy 21 in this pregnancy  The nasal bone was assessed  via ultrasound and was present  The combined risk is  therefore likely to be less than the calculated risk  Other  findings later in the pregnancy may change the risk    Nasal bone assessment is best accomplished through a fetal  ultrasound performed between 11 weeks 0 days through 13  weeks 6 days  In assessing the risk for aneuploidy, the  evaluation of the maternal serum markers plus the nuchal  thickness measurement is calculated first  Any potential  change to the patient's risk for aneuploidy depends on the  nuchal thickness, crown-rump length, and the ethnic origin,  and therefore the values generated by the algorithm itself  will not change  Additional information about the assessment  of the fetal nasal bone may be found on the [facility] website at  http://www  fetalmedicine com/fmf/training-certification/cert  jnzwwaow-ph-joxgr  lucinda/11-13-week-scan/assessment-of-the-nasal-bone/  Please note that the Sequential Integrated maternal serum  screen for Down syndrome risk assessment was designed by Dr Vivian Mejia ([address], et al J Med Screen 2003 v10 p56-104)  to provide a high detection rate and low false positive rate  when a cutoff of 1:50 is used to identify high risk  pregnancies during the first trimester  Use of any other  cutoff for determination of risk in the first trimester will  result in a higher false positive rate for the two-part  screen  All patients whose risk is lower than 1:50 should  have a second sample submitted to complete the screen  This is a screening test, not a diagnostic test      This risk assessment is based on demographic data provided  by the ordering physician  Please notify the laboratory  promptly if any data are incorrect  If you have questions concerning this report: For clinical consultation, call [phone]; For technical questions, call [phone] ext [phone]; For recalculations, fax to [phone]  For additional information, please refer to  http://education  [employer]/faq/FAQ85  (This link is provided for informational/educational  purposes only )   REFERRING PHYSICIAN NAME [address] [place] [address] PHONE [phone]     REFERRING PHYSICIAN NPI [id number]     DATE OF BIRTH 1995     COLLECTION DATE 01/04/2017     MATERNAL WEIGHT 116 lbs     EST'D DATE OF DELIVERY 07/10/2017     CLARITA DETERMINED BY LMP     MOTHER'S ETHNIC ORIGIN      NUMBER OF FETUSES 1     INSULIN DEPEND DIABETIC NO     REPEAT SPECIMEN NO     HX OF NEURAL TUBE DEFECTS NO     PREV PREG DOWN SYND NO     DONOR EGG NO     DONOR EGG; EGG RETRIEVAL NOT GIVEN     ULTRASOUND DATE 01/03/2017     ULTRASONOGRAPHER'S NAME AMERICA FERRELL     NTQR ULTRASONOGRAPHER ID# [id number]     NTQR LOCATION ID# NOT GIVEN     NTQR READING PHYS ID# [id number]     [facility] ULTRASONOGRAPHER ID# NOT GIVEN     CROWN RUMP LENGTH 75 mm     NUCHAL TRANSLUCENCY 2 5 mm     Nasal Bone PRESENT     IF TWINS NOT GIVEN     TWIN B CRL NOT GIVEN mm     TWIN B NT NOT GIVEN mm     Twin B Nasal Bone NOT GIVEN Anaphylaxis

## 2018-11-07 NOTE — ED PROVIDER NOTE - SKIN, MLM
Skin normal color for race, warm, dry and intact. +rash with excoriations and wheals to legs, arms, torso. No open lesions

## 2018-11-07 NOTE — ED PROVIDER NOTE - OBJECTIVE STATEMENT
Patient is a 28 year old female with history of allergies to unknown substance presenting with rash, itching, sob. She notes about 1-2 days ago she developed mild rash with itching on her arms which has progressed. Now in torso and both legs as well. This morning she noticed the rash was worse and developed sob. No throat swelling, trouble swallowing. No meds for symptoms as pt notes she is allergic to benadryl. No vomiting, bug bites, people with similar symptoms at home.

## 2018-11-07 NOTE — ED PROVIDER NOTE - ATTENDING CONTRIBUTION TO CARE
Attending Note ([place]): Received from , patient with anaphylaxis, allergic to benadryl.  patient well appearing, urticaria on extremities. itching.  agree with plan.  CDU for monitoring.

## 2018-11-07 NOTE — ED CDU PROVIDER INITIAL DAY NOTE - ATTENDING CONTRIBUTION TO CARE
28 F  p/w Urticaria since yesterday then today throat tightness and chest discomfort.  Unknown exposure, had anaphylaxis previously, unknown exposure as well at that time, got epi, prednisone, duoneb.  Has seen allergist in the past, workup without known allergen so far.  Reports that benadryl makes her hives worse in the past. PMHX DVT/brain during pregnancy,  meds-  none.  PSHX none  No T.  All – NKA.  Exam – pruritis, airway patent, normal voice, pharynx normal.  Some scattered hives to L arm, low back.  Clear lungs, nontender abd.   observation post epinephrine IM, hydroxyzine q6h, pepcid q12 hr, steroids qd, Reassess in later afternoon.  If all acceptable at that time, f/u allergy with rx for epipen  VS: mild tachycardia, htn    GEN - NAD; pruritis; A+O x3   HEAD - NC/AT     ENT - PEERL, EOMI, mucous membranes  moist , no discharge      NECK: Neck supple, non-tender without lymphadenopathy, no masses, no JVD  PULM - CTA b/l,  symmetric breath sounds  COR -  normal heart sounds    ABD - , ND, NT, soft,  BACK - no CVA tenderness, nontender spine     EXTREMS - no edema, no deformity, warm and well perfused    SKIN - no bruising   Scattered hives to L arm, low back.   NEUROLOGIC - alert, CN 2-12 intact, sensation nl, motor no focal deficit.

## 2018-11-07 NOTE — ED CDU PROVIDER INITIAL DAY NOTE - PROGRESS NOTE DETAILS
Pt reassessed, she states she is drowsy and will be driving herself home. Will hold off on dispo until pt is more alert. Pt reports significant improvement in rash and itch, she no longer has chest discomfort. Pt states she is feeling much better, feels comfortable to drive home and would like to leave. Sent prescription for prednisone, atarax and epi-pen to her pharmacy. Pt will follow up with her PMD and allergist. Upon discharge pt states she used oxy-clean for the first time 2 weeks ago to wash her sheets. She normally uses detergent for sensitive skin. Advised pt to stop using this detergent as it maybe the causative agent.

## 2018-11-07 NOTE — ED PROVIDER NOTE - MEDICAL DECISION MAKING DETAILS
Patient presents with allergic reaction involving 2 organ systems. no signs of chock. + mild anaphylaxis. unable to give benadryl. will give pepcid, epi, steroids, albuterol. signed out to man for tele monitor Patient presents with allergic reaction involving 2 organ systems. no signs of chock. + mild anaphylaxis. unable to give benadryl. will give pepcid, epi, steroids, albuterol. signed out to man for tele monitor  CHRIS MCNALLY - pt received from intake, NAD, epi, pepcid, duoneb received. Will place in CDU for obs

## 2018-11-07 NOTE — ED ADULT NURSE NOTE - OBJECTIVE STATEMENT
Pt  transferred from  Intake. patient presents with a history of anaphylaxis however pt does not know what she is allergic to, reports she has seen an allergist in the past but does not know diagnosis. pt reports she often east shellfish with no allergic reaction and has not used any new hair products, lotions, perfumes, detergents or soaps, no new foods as per patient. pt reports over the past few days experiencing hives and itching to the arms and face which today progressed to chest tightness. Denies SOB no angioedema or stridor noted. Pt speaking full sentences with no difficulty, medicated with epi, famotidine (pt claims she is allergic to Benadryl). will CTM.

## 2018-11-07 NOTE — ED CDU PROVIDER DISPOSITION NOTE - PLAN OF CARE
Follow up with your primary care provider within 48 hours  Follow up with an allergist within one week, referral list provided  Take Prednisone 40mg (2 tablets) daily for 4 days  Take Pepcid 20mg twice daily for 4 days  Take Atarax 25mg every 6-8 hours for itching/hives  You were prescribed an EpiPen, self inject into your thigh if you are having a severe allergic reaction and then come directly to the ER  Return to the ER with any worsening or concerning symptoms, shortness of breath, difficulty breathing, throat swelling, nausea, vomiting, rash or any other concerns.

## 2018-11-07 NOTE — ED ADULT NURSE REASSESSMENT NOTE - NS ED NURSE REASSESS COMMENT FT1
pt no longer reports SOB no wheezing no angioedema no stridor or wheezing, LS clear. Pt still reports itching will give atarax as per orders.

## 2018-11-07 NOTE — ED CDU PROVIDER DISPOSITION NOTE - CLINICAL COURSE
28 F  p/w Urticaria since yesterday then today throat tightness and chest discomfort.  Unknown exposure, had anaphylaxis previously, unknown exposure as well at that time, got epi, prednisone, duoneb.  Has seen allergist in the past, workup without known allergen so far.  Reports that benadryl makes her hives worse in the past. PMHX DVT/brain during pregnancy,  meds-  none.  PSHX none  No T.  All – NKA.  Exam – pruritis, airway patent, normal voice, pharynx normal.  Some scattered hives to L arm, low back.  Clear lungs, nontender abd.   observation post epinephrine IM, hydroxyzine q6h, pepcid q12 hr, steroids qd, Reassess in later afternoon.  If all acceptable at that time, f/u allergy with rx for epipen

## 2018-11-07 NOTE — ED ADULT TRIAGE NOTE - CHIEF COMPLAINT QUOTE
Pt c/o allergic reaction since 2am--pt c/o hives all over and chest tightness. Pt very anxious at triage. Pt ate shellfish yesterday morning

## 2018-11-07 NOTE — ED CDU PROVIDER INITIAL DAY NOTE - OBJECTIVE STATEMENT
28 F  p/w Urticaria since yesterday then today throat tightness and chest discomfort.  Unknown exposure, had anaphylaxis previously, unknown exposure as well at that time, got epi, prednisone, duoneb.  Has seen allergist in the past, workup without known allergen so far.  Reports that benadryl makes her hives worse in the past. PMHX DVT/brain during pregnancy,  meds-  none.  PSHX none  No T.  All – NKA.  Exam – pruritis, airway patent, normal voice, pharynx normal.  Some scattered hives to L arm, low back.  Clear lungs, nontender abd.

## 2018-11-07 NOTE — ED CDU PROVIDER INITIAL DAY NOTE - DETAILS
observation post epinephrine IM, hydroxyzine q6h, pepcid q12 hr, steroids qd, Reassess in later afternoon.  If all acceptable at that time, f/u allergy with rx for epipen

## 2018-11-07 NOTE — ED ADULT NURSE NOTE - NSIMPLEMENTINTERV_GEN_ALL_ED
Implemented All Universal Safety Interventions:  [place] to call system. Call bell, personal items and telephone within reach. Instruct patient to call for assistance. Room bathroom lighting operational. Non-slip footwear when patient is off stretcher. Physically safe environment: no spills, clutter or unnecessary equipment. Stretcher in lowest position, wheels locked, appropriate side rails in place.

## 2018-11-07 NOTE — ED ADULT NURSE NOTE - CHPI ED NUR SYMPTOMS NEG
no difficulty swallowing/no shortness of breath/no throat itching/no vomiting/no wheezing/no difficulty breathing/no nausea/no swelling of face, tongue

## 2018-11-07 NOTE — ED PROVIDER NOTE - ENMT, MLM
Airway patent, Nasal mucosa clear. Mouth with normal mucosa. Throat has no vesicles, no oropharyngeal exudates and uvula is midline and not swollen. Tolerating secretions

## 2018-11-07 NOTE — ED PROVIDER NOTE - PMH
11 weeks gestation of pregnancy    Dural sinus thrombosis    Migraines    Ophthalmalgia, bilateral    Photophobia, bilateral

## 2019-01-03 NOTE — PROGRESS NOTE ADULT - PROBLEM SELECTOR PLAN 1
due to cerebral vein sinus thrombosis as confirmed on CT head  -c/w lovenox full dose-100mg bid   per neuro- symptoms will take a while to resolve  dc home tonight after evening dose of lovenox  apprec vasc med input- outpt hypercoag testing (0) indicator not present

## 2019-01-09 NOTE — PATIENT PROFILE ADULT. - HEALTHCARE QUESTIONS, PROFILE
This is the Subsequent Medicare Annual Wellness Exam, performed 12 months or more after the Initial AWV or the last Subsequent AWV I have reviewed the patient's medical history in detail and updated the computerized patient record. As well as for follow-up of her health issues. She is been generally doing okay. She was seen in the emergency room in October with palpitations. She has had none since then and thinks that they may be related to her anxiety. She saw the cardiologist and has a stress test and echocardiogram scheduled. She denies headaches or dizziness. No chest pains or shortness of breath. No recent palpitations. No change in bowel or bladder habits. She is seeing the gynecologist and dermatologist regularly. Her anxiety is under good control. She is using clonazepam only irregularly. History Past Medical History:  
Diagnosis Date  Adjustment disorder with mixed anxiety and depressed mood Dr. Renetta Bro  Allergic rhinitis  Anxiety  Colon polyp  Hypercholesterolemia  IBS (irritable bowel syndrome)  Thyroid disease   
 hypothyroid Past Surgical History:  
Procedure Laterality Date  DENTAL SURGERY PROCEDURE    
 HX CATARACT REMOVAL Bilateral 12/2016  HX COLONOSCOPY  7/15/15 Dr. Nika Stephenson - repeat in 1 yr  HX COLONOSCOPY  08/29/2016 Dr. Cecy Daigle - 3 yr f/u  
 HX HYSTERECTOMY  HX OTHER SURGICAL  10/2015   
 left foot sx s/p fracture  HX OTHER SURGICAL Left 10/01/2017  
 removed bone spur from left foot  PLASTIC EYE PROSTH CUSTOM    
 eye lift  PREP FACE/ORAL PROST PALATAL LIFT    
 face lift Current Outpatient Medications Medication Sig Dispense Refill  varicella-zoster recombinant, PF, (SHINGRIX, PF,) 50 mcg/0.5 mL susr injection 0.5 mL by IntraMUSCular route once for 1 dose. 0.5 mL 1  
 levothyroxine (SYNTHROID) 150 mcg tablet Take 1 Tab by mouth Daily (before breakfast).  90 Tab 1  
  clonazePAM (KLONOPIN) 0.5 mg tablet Take 1 Tab by mouth as needed. Max Daily Amount: 48 mg. 30 Tab 1  
 traZODone (DESYREL) 100 mg tablet Take 1 Tab by mouth nightly. 90 Tab 1  
 rosuvastatin (CRESTOR) 5 mg tablet TAKE ONE TABLET BY MOUTH EVERY NIGHT AT BEDTIME 90 Tab 0  
 cyanocobalamin (VITAMIN B-12) 1,000 mcg tablet Take 1,000 mcg by mouth daily.  FOLIC ACID/MULTIVIT-MIN/LUTEIN (CENTRUM SILVER PO) Take 1 Tab by mouth daily.  PARoxetine (PAXIL) 40 mg tablet Take 40 mg by mouth daily.  ASCORBATE CALCIUM (VITAMIN C PO) Take 500 mg by mouth daily.  CHOLECALCIFEROL, VITAMIN D3, (VITAMIN D3 PO) Take 1,000 Units by mouth daily.  estropipate (OGEN) 1.5 mg tablet Take 1.5 mg by mouth daily. No Known Allergies Family History Problem Relation Age of Onset  Heart Disease Father  Cancer Mother  Dementia Mother  Heart Disease Brother  Heart Disease Brother  Cancer Brother ? Nose  Anesth Problems Neg Hx Social History Tobacco Use  Smoking status: Former Smoker Packs/day: 0.75 Years: 35.00 Pack years: 26.25 Types: Cigarettes Last attempt to quit: 1986 Years since quittin.3  Smokeless tobacco: Never Used Substance Use Topics  Alcohol use: No  
 
Patient Active Problem List  
Diagnosis Code  Mixed hyperlipidemia E78.2  Hypothyroid E03.9  Allergic rhinitis J30.9  Adjustment disorder with mixed anxiety and depressed mood F43.23  
 Advance care planning Z71.89  
ROS - Per HPI Physical Examination: General appearance - alert, well appearing, and in no distress Eyes - pupils equal and reactive, extraocular eye movements intact Ears - bilateral TM's and external ear canals normal 
Nose - normal and patent, no erythema, discharge or polyps Mouth - mucous membranes moist, pharynx normal without lesions Neck - supple, no significant adenopathy Lymphatics - no palpable lymphadenopathy, no hepatosplenomegaly Chest - clear to auscultation, no wheezes, rales or rhonchi, symmetric air entry Heart - normal rate, regular rhythm, normal S1, S2, no murmurs, rubs, clicks or gallops Abdomen - soft, nontender, nondistended, no masses or organomegaly Back exam - full range of motion, no tenderness, palpable spasm or pain on motion Neurological - alert, oriented, normal speech, no focal findings or movement disorder noted Musculoskeletal - no joint tenderness, deformity or swelling Extremities - peripheral pulses normal, no pedal edema, no clubbing or cyanosis Depression Risk Factor Screening: PHQ over the last two weeks 1/9/2019 Little interest or pleasure in doing things Several days Feeling down, depressed, irritable, or hopeless Several days Total Score PHQ 2 2 Alcohol Risk Factor Screening: You do not drink alcohol or very rarely. Functional Ability and Level of Safety:  
Hearing Loss Hearing is good. Activities of Daily Living The home contains: no safety equipment. Patient does total self care Fall Risk Fall Risk Assessment, last 12 mths 1/9/2019 Able to walk? Yes Fall in past 12 months? No  
Fall with injury? -  
Number of falls in past 12 months - Fall Risk Score -  
 
 
Abuse Screen Patient is not abused Cognitive Screening Evaluation of Cognitive Function: 
Has your family/caregiver stated any concerns about your memory: no 
 
 
Patient Care Team  
Patient Care Team: 
Kobi Armando MD as PCP - Nish Anaya PhD as Physician (Psychiatry) Bereket Rhodes MD as Physician (Cardiology) Joellen Hopkins MD as Physician (Pediatric Allergy) Navi Wills MD as Physician (Pulmonary Disease) Candis Payne MD as Physician (Dermatology) Michael Painting MD as Physician (Ophthalmology) Mili Thomas MD as Physician (Obstetrics & Gynecology) Carmelita Muhammad OD (Optometry) Faye Serrato MD (Colon and Rectal Surgery) Assessment/Plan Education and counseling provided: 
Are appropriate based on today's review and evaluation End-of-Life planning (with patient's consent) Screening Mammography Diagnoses and all orders for this visit: 
 
1. Other specified hypothyroidism -appears euthyroid. Will check levels and adjust medications as needed. -     levothyroxine (SYNTHROID) 150 mcg tablet; Take 1 Tab by mouth Daily (before breakfast). -     TSH AND FREE T4 
 
2. Mixed hyperlipidemia-tolerating Crestor well. Will continue the current medicines and check for LDL of 100. 
-     LIPID PANEL 3. Anxiety-well-controlled on Paxil. Will refill her clonazepam for emergencies. -     clonazePAM (KLONOPIN) 0.5 mg tablet; Take 1 Tab by mouth as needed. Max Daily Amount: 48 mg. 
 
4. Medicare annual wellness visit, subsequent Other orders 
-     varicella-zoster recombinant, PF, (SHINGRIX, PF,) 50 mcg/0.5 mL susr injection; 0.5 mL by IntraMUSCular route once for 1 dose. Follow-up here in 12 months and as needed. Health Maintenance Due Topic Date Due  Shingrix Vaccine Age 50> (1 of 2) 02/01/1995  BREAST CANCER SCRN MAMMOGRAM  03/15/2018  MEDICARE YEARLY EXAM  12/23/2018  GLAUCOMA SCREENING Q2Y  01/20/2019 none

## 2019-06-11 ENCOUNTER — EMERGENCY (EMERGENCY)
Facility: HOSPITAL | Age: 29
LOS: 1 days | Discharge: HOME | End: 2019-06-11
Attending: EMERGENCY MEDICINE | Admitting: EMERGENCY MEDICINE
Payer: MEDICAID

## 2019-06-11 VITALS
DIASTOLIC BLOOD PRESSURE: 93 MMHG | TEMPERATURE: 98 F | RESPIRATION RATE: 18 BRPM | SYSTOLIC BLOOD PRESSURE: 140 MMHG | HEART RATE: 64 BPM | OXYGEN SATURATION: 100 %

## 2019-06-11 LAB
ALBUMIN SERPL ELPH-MCNC: 4.1 G/DL — SIGNIFICANT CHANGE UP (ref 3.3–5)
ALP SERPL-CCNC: 65 U/L — SIGNIFICANT CHANGE UP (ref 40–120)
ALT FLD-CCNC: 16 U/L — SIGNIFICANT CHANGE UP (ref 4–33)
ANION GAP SERPL CALC-SCNC: 14 MMO/L — SIGNIFICANT CHANGE UP (ref 7–14)
APTT BLD: 29.2 SEC — SIGNIFICANT CHANGE UP (ref 27.5–36.3)
AST SERPL-CCNC: 14 U/L — SIGNIFICANT CHANGE UP (ref 4–32)
BASOPHILS # BLD AUTO: 0.03 K/UL — SIGNIFICANT CHANGE UP (ref 0–0.2)
BASOPHILS NFR BLD AUTO: 0.4 % — SIGNIFICANT CHANGE UP (ref 0–2)
BILIRUB SERPL-MCNC: < 0.2 MG/DL — LOW (ref 0.2–1.2)
BUN SERPL-MCNC: 16 MG/DL — SIGNIFICANT CHANGE UP (ref 7–23)
CALCIUM SERPL-MCNC: 9.4 MG/DL — SIGNIFICANT CHANGE UP (ref 8.4–10.5)
CHLORIDE SERPL-SCNC: 103 MMOL/L — SIGNIFICANT CHANGE UP (ref 98–107)
CO2 SERPL-SCNC: 25 MMOL/L — SIGNIFICANT CHANGE UP (ref 22–31)
CREAT SERPL-MCNC: 0.77 MG/DL — SIGNIFICANT CHANGE UP (ref 0.5–1.3)
EOSINOPHIL # BLD AUTO: 0.24 K/UL — SIGNIFICANT CHANGE UP (ref 0–0.5)
EOSINOPHIL NFR BLD AUTO: 3.1 % — SIGNIFICANT CHANGE UP (ref 0–6)
GLUCOSE SERPL-MCNC: 93 MG/DL — SIGNIFICANT CHANGE UP (ref 70–99)
HCT VFR BLD CALC: 38 % — SIGNIFICANT CHANGE UP (ref 34.5–45)
HGB BLD-MCNC: 11.8 G/DL — SIGNIFICANT CHANGE UP (ref 11.5–15.5)
IMM GRANULOCYTES NFR BLD AUTO: 0.4 % — SIGNIFICANT CHANGE UP (ref 0–1.5)
INR BLD: 1.06 — SIGNIFICANT CHANGE UP (ref 0.88–1.17)
LYMPHOCYTES # BLD AUTO: 3.13 K/UL — SIGNIFICANT CHANGE UP (ref 1–3.3)
LYMPHOCYTES # BLD AUTO: 40.8 % — SIGNIFICANT CHANGE UP (ref 13–44)
MCHC RBC-ENTMCNC: 26.9 PG — LOW (ref 27–34)
MCHC RBC-ENTMCNC: 31.1 % — LOW (ref 32–36)
MCV RBC AUTO: 86.8 FL — SIGNIFICANT CHANGE UP (ref 80–100)
MONOCYTES # BLD AUTO: 0.72 K/UL — SIGNIFICANT CHANGE UP (ref 0–0.9)
MONOCYTES NFR BLD AUTO: 9.4 % — SIGNIFICANT CHANGE UP (ref 2–14)
NEUTROPHILS # BLD AUTO: 3.53 K/UL — SIGNIFICANT CHANGE UP (ref 1.8–7.4)
NEUTROPHILS NFR BLD AUTO: 45.9 % — SIGNIFICANT CHANGE UP (ref 43–77)
NRBC # FLD: 0 K/UL — SIGNIFICANT CHANGE UP (ref 0–0)
PLATELET # BLD AUTO: 305 K/UL — SIGNIFICANT CHANGE UP (ref 150–400)
PMV BLD: 10.2 FL — SIGNIFICANT CHANGE UP (ref 7–13)
POTASSIUM SERPL-MCNC: 4.6 MMOL/L — SIGNIFICANT CHANGE UP (ref 3.5–5.3)
POTASSIUM SERPL-SCNC: 4.6 MMOL/L — SIGNIFICANT CHANGE UP (ref 3.5–5.3)
PROT SERPL-MCNC: 7.4 G/DL — SIGNIFICANT CHANGE UP (ref 6–8.3)
PROTHROM AB SERPL-ACNC: 11.8 SEC — SIGNIFICANT CHANGE UP (ref 9.8–13.1)
RBC # BLD: 4.38 M/UL — SIGNIFICANT CHANGE UP (ref 3.8–5.2)
RBC # FLD: 15.1 % — HIGH (ref 10.3–14.5)
SODIUM SERPL-SCNC: 142 MMOL/L — SIGNIFICANT CHANGE UP (ref 135–145)
WBC # BLD: 7.68 K/UL — SIGNIFICANT CHANGE UP (ref 3.8–10.5)
WBC # FLD AUTO: 7.68 K/UL — SIGNIFICANT CHANGE UP (ref 3.8–10.5)

## 2019-06-11 PROCEDURE — 99284 EMERGENCY DEPT VISIT MOD MDM: CPT

## 2019-06-11 RX ORDER — SODIUM CHLORIDE 9 MG/ML
1000 INJECTION INTRAMUSCULAR; INTRAVENOUS; SUBCUTANEOUS ONCE
Refills: 0 | Status: COMPLETED | OUTPATIENT
Start: 2019-06-11 | End: 2019-06-11

## 2019-06-11 RX ORDER — METOCLOPRAMIDE HCL 10 MG
10 TABLET ORAL ONCE
Refills: 0 | Status: COMPLETED | OUTPATIENT
Start: 2019-06-11 | End: 2019-06-11

## 2019-06-11 RX ORDER — KETOROLAC TROMETHAMINE 30 MG/ML
15 SYRINGE (ML) INJECTION ONCE
Refills: 0 | Status: DISCONTINUED | OUTPATIENT
Start: 2019-06-11 | End: 2019-06-11

## 2019-06-11 RX ADMIN — Medication 15 MILLIGRAM(S): at 22:35

## 2019-06-11 RX ADMIN — Medication 10 MILLIGRAM(S): at 22:35

## 2019-06-11 RX ADMIN — SODIUM CHLORIDE 1000 MILLILITER(S): 9 INJECTION INTRAMUSCULAR; INTRAVENOUS; SUBCUTANEOUS at 22:35

## 2019-06-11 NOTE — ED PROVIDER NOTE - ATTENDING CONTRIBUTION TO CARE
I performed a face to face bedside interview with patient regarding history of present illness, review of symptoms and past medical history. I completed an independent physical exam.  I have discussed patient's plan of care.   I agree with note as stated above, having amended the EMR as needed to reflect my findings. I have discussed the assessment and plan of care.  This includes during the time I functioned as the attending physician for this patient.  Attending Contribution to Care: agree with plan of pa. pt p/w headache, similar symptoms to previous cavernous venous thrombosis. pt symptoms similar but signifantly less then previous episode. pt with no diplopia, no neuro deficits, no conjunctival injection. neuro on board and agree with plan for ctv, pending imaging. pt signed out to incoming team.

## 2019-06-11 NOTE — ED PROVIDER NOTE - EYES, MLM
Clear bilaterally, pupils equal, round and reactive to light. +left monocular blurry vision/diplopia?

## 2019-06-11 NOTE — ED PROVIDER NOTE - PROGRESS NOTE DETAILS
CHRIS Chirinos- received sign out pending CTV and neuro to see pt. CTV resulted, neuro paged, states will see pt shortly paged neuro again, recommending for pt to be dc and follow up outpt with Dr. Mckeon paged neuro again, recommending for pt to be dc and follow up outpt with Dr. Mckeon. pt states headache is much better and wants to go home

## 2019-06-11 NOTE — ED PROVIDER NOTE - OBJECTIVE STATEMENT
30 y/o F h/o cavernous sinus thrombosis in pregnancy, no longer on a/c c/o left sided HA with left eye blurry vision x 3 weeks. Pt states headache radiates down left neck and is very similar to headache she experienced when dx with cavernous sinus thrombosis last year. Denies fever, chills, CP, SOB, dizziness, abdominal pain, n/v, numbness/tingling/weakness.

## 2019-06-11 NOTE — ED PROVIDER NOTE - CLINICAL SUMMARY MEDICAL DECISION MAKING FREE TEXT BOX
pt with similar headache to previous dx of cavernous sinus thrombosis; otherwise nad in ED  -cbc, cmp, coags, ct venogram, neuro c/s

## 2019-06-11 NOTE — ED PROVIDER NOTE - NSFOLLOWUPINSTRUCTIONS_ED_ALL_ED_FT
Follow up with your Neurologist Dr. Mckeon within 48 hours.    Return to ER for any new or worsening symptoms, fever, weakness, numbness, slurred speech, dizziness, headache or any other concerns.

## 2019-06-11 NOTE — ED ADULT NURSE NOTE - OBJECTIVE STATEMENT
Pt received to intake room 9. Pt comes to ED c/o of headaches X3 weeks. Pt has hx of having a blood clot in her brain. Pt has no neuro deficits at this time. Pt is A&Ox4 and ambulates independently. 20 G IV placed to right AC, labs drawn & sent, pt medicated as per orders.

## 2019-06-11 NOTE — ED ADULT TRIAGE NOTE - CHIEF COMPLAINT QUOTE
Pt complaining of migraine x3 weeks, pt states she has a hx of blood clot in your brain. Pt denies use of blood thinners at this time.

## 2019-06-12 VITALS — DIASTOLIC BLOOD PRESSURE: 68 MMHG | SYSTOLIC BLOOD PRESSURE: 113 MMHG | HEART RATE: 57 BPM | TEMPERATURE: 98 F

## 2019-06-12 PROCEDURE — 70496 CT ANGIOGRAPHY HEAD: CPT | Mod: 26

## 2019-06-12 NOTE — CONSULT NOTE ADULT - SUBJECTIVE AND OBJECTIVE BOX
HPI:    29 year old patient with pmh of sinus venous thrombosis during pregnancy presented to ED for eval of headache. Patient is currently on not any anticoagulation. As per patient, on memorial day she felt something different and soon after that started experiencing headache. headache is throbbing, pressure, mostly on left side, constant, relieved by ibuprofen, associated with no nausea, vomiting, photophobia but occasionally associated with phonophobia. 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 She mentioned that she had no episode where she was woke up in the middle of night with headache but reported that headache will kick after she is awake.   She reported no change in her vision, speech, vertigo, weakness, numbness.        MEDICATIONS  (STANDING):    MEDICATIONS  (PRN):      PAST MEDICAL & SURGICAL HISTORY:  Dural sinus thrombosis  Ophthalmalgia, bilateral  Photophobia, bilateral  Migraines  No significant past surgical history      FAMILY HISTORY:  Family history of diabetes mellitus (Grandparent)  Family history of hypertension (Grandparent): grandmother      Allergies    Benadryl (Hives)  cortisone (Hives)  pcn (Unknown)  penicillin (Hives)  Tylenol (Hives)  Tylenol (Urticaria)  Zofran (Anaphylaxis)  Zofran (Rash)    Intolerances          SHx - No smoking, No ETOH, No drug abuse      Review of Systems:  CONSTITUTIONAL:  No weight loss, fever, chills, weakness or fatigue.  HEENT:  Eyes:  No visual loss, blurred vision, double vision or yellow sclerae. Ears, Nose, Throat:  No hearing loss, sneezing, congestion, runny nose or sore throat.  SKIN:  No rash or itching.  CARDIOVASCULAR:  No chest pain, chest pressure or chest discomfort. No palpitations or edema.  RESPIRATORY:  No shortness of breath, cough or sputum.  GASTROINTESTINAL:  No anorexia, nausea, vomiting or diarrhea. No abdominal pain or blood.  GENITOURINARY:  NO Burning on urination.   NEUROLOGICAL: See HPI  MUSCULOSKELETAL:  No muscle, back pain, joint pain or stiffness.  HEMATOLOGIC:  No anemia, bleeding or bruising.  LYMPHATICS:  No enlarged nodes. No history of splenectomy.  PSYCHIATRIC:  No history of depression or anxiety.  ENDOCRINOLOGIC:  No reports of sweating, cold or heat intolerance. No polyuria or polydipsia.  ALLERGIES:  No history of asthma, hives, eczema or rhinitis.        Vital Signs Last 24 Hrs  T(C): 36.7 (12 Jun 2019 01:37), Max: 36.9 (11 Jun 2019 18:37)  T(F): 98 (12 Jun 2019 01:37), Max: 98.4 (11 Jun 2019 18:37)  HR: 57 (12 Jun 2019 01:37) (57 - 64)  BP: 113/68 (12 Jun 2019 01:37) (113/68 - 140/93)  BP(mean): --  RR: 18 (11 Jun 2019 18:37) (18 - 18)  SpO2: 100% (11 Jun 2019 18:37) (100% - 100%)    General Exam:   General appearance: No acute distress                   Neurological Exam:    Mental Status: Orientated to self, date and place.  Attention intact.  No dysarthria, aphasia or neglect.  Cranial Nerves: PERRL, EOMI, CN V1-3 intact to light touch and pinprick.  No facial asymmetry, Tongue, uvula and palate midline.    Motor:   Tone: normal.                  Strength:   intact without any drifts   Dysmetria: None to finger-nose-finger or heel-shin-heel  No truncal ataxia.    Tremor: No resting, postural or action tremor.  No myoclonus.  Sensation: intact to light touch  Deep Tendon Reflexes: 1+ bilateral biceps, triceps, brachioradialis, knee and ankle  Toes flexor bilaterally  Gait: normal and stable.      Other:    06-11    142  |  103  |  16  ----------------------------<  93  4.6   |  25  |  0.77    Ca    9.4      11 Jun 2019 22:30    TPro  7.4  /  Alb  4.1  /  TBili  < 0.2<L>  /  DBili  x   /  AST  14  /  ALT  16  /  AlkPhos  65  06-11 06-11    142  |  103  |  16  ----------------------------<  93  4.6   |  25  |  0.77    Ca    9.4      11 Jun 2019 22:30    TPro  7.4  /  Alb  4.1  /  TBili  < 0.2<L>  /  DBili  x   /  AST  14  /  ALT  16  /  AlkPhos  65  06-11                          11.8   7.68  )-----------( 305      ( 11 Jun 2019 22:30 )             38.0       Radiology    CT  MRI  EKG:  tele:  TTE:  EEG:

## 2019-06-12 NOTE — CONSULT NOTE ADULT - ASSESSMENT
29 year old patient with pmh of sinus venous thrombosis during pregnancy presented to ED for eval of headache. Patient is currently on not any anticoagulation. As per patient, on memorial day she felt something different and soon after that started experiencing headache. headache is throbbing, pressure, mostly on left side, constant, relieved by ibuprofen, associated with no nausea, vomiting, photophobia but occasionally associated with phonophobia. 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 She mentioned that she had no episode where she was woke up in the middle of night with headache but reported that headache will kick after she is awake.   She reported no change in her vision, speech, vertigo, weakness, numbness. CT venogram of head was unremarkable. visual acuity and neuro exam unremarkable.     Impression     New Daily Persistent Headache     Plan     Outpatient follow up with  - patient's neurologist   outpatient MRI brain 29 year old patient with pmh of sinus venous thrombosis during pregnancy presented to ED for eval of headache. Patient is currently on not any anticoagulation. As per patient, on memorial day she felt something different and soon after that started experiencing headache. headache is throbbing, pressure, mostly on left side, constant, relieved by ibuprofen, associated with no nausea, vomiting, photophobia but occasionally associated with phonophobia. 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 She mentioned that she had no episode where she was woke up in the middle of night with headache but reported that headache will kick after she is awake.   She reported no change in her vision, speech, vertigo, weakness, numbness. CT venogram of head was unremarkable. visual acuity and neuro exam unremarkable.     Impression     New Daily Persistent Headache     Plan     Outpatient follow up with  - patient's private neurologist   outpatient MRI brain   Supportive treatment

## 2019-07-12 ENCOUNTER — APPOINTMENT (OUTPATIENT)
Dept: NEUROLOGY | Facility: CLINIC | Age: 29
End: 2019-07-12
Payer: MEDICAID

## 2019-07-12 VITALS
DIASTOLIC BLOOD PRESSURE: 78 MMHG | WEIGHT: 218 LBS | HEART RATE: 87 BPM | SYSTOLIC BLOOD PRESSURE: 121 MMHG | BODY MASS INDEX: 33.04 KG/M2 | HEIGHT: 68 IN

## 2019-07-12 DIAGNOSIS — R51 HEADACHE: ICD-10-CM

## 2019-07-12 DIAGNOSIS — Z86.718 PERSONAL HISTORY OF OTHER VENOUS THROMBOSIS AND EMBOLISM: ICD-10-CM

## 2019-07-12 DIAGNOSIS — Z78.9 OTHER SPECIFIED HEALTH STATUS: ICD-10-CM

## 2019-07-12 PROCEDURE — 99215 OFFICE O/P EST HI 40 MIN: CPT

## 2019-07-12 RX ORDER — GABAPENTIN 300 MG/1
300 CAPSULE ORAL 3 TIMES DAILY
Refills: 0 | Status: ACTIVE | COMMUNITY

## 2019-07-12 RX ORDER — PROPRANOLOL HYDROCHLORIDE 10 MG/1
10 TABLET ORAL
Qty: 60 | Refills: 1 | Status: ACTIVE | COMMUNITY
Start: 2019-07-12 | End: 1900-01-01

## 2019-07-12 NOTE — REVIEW OF SYSTEMS
[Fever] : no fever [As Noted in HPI] : as noted in HPI [Chills] : no chills [Feeling Tired] : feeling tired [Hypersensitivity] : no hypersensitivity [Dizziness] : no dizziness [Abnormal Sensation] : no abnormal sensation [Migraine Headache] : migraine headaches [Tension Headache] : tension-type headaches [Lightheadedness] : no lightheadedness [Negative] : Integumentary

## 2019-07-12 NOTE — HISTORY OF PRESENT ILLNESS
[FreeTextEntry1] : Patient reports that back in June of 2017 she started having headaches and visual symptoms where it appeared blurry and could not focus and see things clearly and she was pregnant at the time. \par She went to the ER at [facility] on June 29th for persistent headaches and after seeing her eye docotr and finding bilateral papilledema she was sent to the ER. She had an MRV and MRI and did not find anything conclusive but then a subsequent CTV found a sigmoid and transverse venous thrombosis. She was subsequently admitted for lovenox injection and continued the injections until January of 2018 when she gave birth.\par She then was doing well with her headaches and following up with Dr. Perez and last visit was 2/2018 and all was improved and no papilledema and was told to follow up in 1 year. \par She again was doing well until this past May when she developed headaches, lightheadedness and dizziness. She also felt very fatigued and disoriented. Her left sided headaches were described as very sharp and throbbing and lancinating and at times numbness on the left side of her face. \par Her vision has been fine and no double or blurry or loss of vision.\par Her headaches have been occurring very regularly almost every other day. She is using 3 advils pills every day for pain relief. She reports that caffeine does not help her. \par She has noted that alcohol alleviated her pain and headaches. \par Her current headache is 5/10 and sometimes get sound and light sensitivity. No nausea or vomiting. \par She is also and neurontin 300mg BID but it makes her drowsy.\par She had a recent MRI and MRV with Dr. Mo last month and it looked stable from 2017.

## 2019-07-12 NOTE — DISCUSSION/SUMMARY
[FreeTextEntry1] : Patient with hx of dural venous sinus thrombosis in 2017 while pregnant and had manifested with headaches, visual symptoms and found to have bilateral papilledema. \par She was on lovenox for 6 months and now the headaches have recurred almost on a daily basis.\par She had follow up imaging which was stable with regards to the venous sinus thrombosis. \par Will proceed with starting Aspirin 81 mg daily and will start as a prevention propranolol 10 mg daily and assess response. \par \par 2. Obtain routine EEG given episodes of confusion and disorientation and leg weakness \par \par 3. Keep headache diary and avoid overuse of over the counter medications and follow up visit with Dr. Perez\par \par 4. Patient will follow up in 2 months and all questions answered.

## 2019-07-12 NOTE — PHYSICAL EXAM
[Oriented To Time, Place, And Person] : oriented to person, place, and time [General Appearance - Alert] : alert [Person] : oriented to person [Cranial Nerves Optic (II)] : visual acuity intact bilaterally,  visual fields full to confrontation, pupils equal round and reactive to light [Place] : oriented to place [Time] : oriented to time [Cranial Nerves Trigeminal (V)] : facial sensation intact symmetrically [Cranial Nerves Facial (VII)] : face symmetrical [Cranial Nerves Oculomotor (III)] : extraocular motion intact [Cranial Nerves Accessory (XI - Cranial And Spinal)] : head turning and shoulder shrug symmetric [Cranial Nerves Vestibulocochlear (VIII)] : hearing was intact bilaterally [Cranial Nerves Glossopharyngeal (IX)] : tongue and palate midline [Cranial Nerves Hypoglossal (XII)] : there was no tongue deviation with protrusion [Motor Tone] : muscle tone was normal in all four extremities [Motor Strength] : muscle strength was normal in all four extremities [Involuntary Movements] : no involuntary movements were seen [Paresis Pronator Drift Right-Sided] : no pronator drift on the right [Paresis Pronator Drift Left-Sided] : no pronator drift on the left [Motor Handedness Right-Handed] : the patient is right hand dominant [Sensation Tactile Decrease] : light touch was intact [Sensation Pain / Temperature Decrease] : pain and temperature was intact [Romberg's Sign] : Romberg's sign was negtive [Sensation Vibration Decrease] : vibration was intact [Past-pointing] : there was no past-pointing [Tremor] : no tremor present [Limited Balance] : balance was intact [Coordination - Dysmetria Impaired Finger-to-Nose Bilateral] : not present [2+] : Patella left 2+ [Plantar Reflex Left Only] : normal on the left [FreeTextEntry8] : patient able to tandem gait  [Plantar Reflex Right Only] : normal on the right [PERRL With Normal Accommodation] : pupils were equal in size, round, reactive to light, with normal accommodation [Extraocular Movements] : extraocular movements were intact [No APD] : no afferent pupillary defect [Full Visual Field] : full visual field [No DAVID] : no internuclear ophthalmoplegia [Outer Ear] : the ears and nose were normal in appearance [Neck Appearance] : the appearance of the neck was normal [Hearing Threshold Finger Rub Not Adams] : hearing was normal [] : no rash [Heart Sounds] : normal S1 and S2 [Skin Lesions] : no skin lesions

## 2019-08-02 ENCOUNTER — APPOINTMENT (OUTPATIENT)
Dept: NEUROLOGY | Facility: CLINIC | Age: 29
End: 2019-08-02

## 2019-08-08 ENCOUNTER — OTHER (OUTPATIENT)
Age: 29
End: 2019-08-08

## 2019-08-13 LAB
24R-OH-CALCIDIOL SERPL-MCNC: 53.5 PG/ML
25(OH)D3 SERPL-MCNC: 10.3 NG/ML
FOLATE SERPL-MCNC: 10.8 NG/ML
MAGNESIUM RBC-MCNC: 4.5 MG/DL
THYROGLOB AB SERPL-ACNC: <20 IU/ML
THYROPEROXIDASE AB SERPL IA-ACNC: <10 IU/ML
TSH SERPL-ACNC: 1.25 UIU/ML
VIT B12 SERPL-MCNC: 386 PG/ML

## 2019-10-14 NOTE — PATIENT PROFILE ADULT. - BLOOD TRANSFUSION, PREVIOUS, PROFILE
Pt is visiting from the [place]. Pt reports living in a  with 0 MING and full flight with HR's to bed/bathroom on the 2nd floor. Pt reports being completely independent with functional mobility and ADL's prior to admission without use of an assistive device. no

## 2019-11-11 ENCOUNTER — EMERGENCY (EMERGENCY)
Facility: HOSPITAL | Age: 29
LOS: 1 days | Discharge: HOME | End: 2019-11-11
Attending: EMERGENCY MEDICINE | Admitting: EMERGENCY MEDICINE
Payer: MEDICAID

## 2019-11-11 VITALS
OXYGEN SATURATION: 99 % | SYSTOLIC BLOOD PRESSURE: 103 MMHG | RESPIRATION RATE: 18 BRPM | HEART RATE: 70 BPM | TEMPERATURE: 98 F | DIASTOLIC BLOOD PRESSURE: 62 MMHG

## 2019-11-11 VITALS
HEART RATE: 65 BPM | TEMPERATURE: 99 F | OXYGEN SATURATION: 100 % | SYSTOLIC BLOOD PRESSURE: 104 MMHG | RESPIRATION RATE: 17 BRPM | DIASTOLIC BLOOD PRESSURE: 60 MMHG

## 2019-11-11 PROCEDURE — 73060 X-RAY EXAM OF HUMERUS: CPT | Mod: 26,RT

## 2019-11-11 PROCEDURE — 71250 CT THORAX DX C-: CPT | Mod: 26

## 2019-11-11 PROCEDURE — 70450 CT HEAD/BRAIN W/O DYE: CPT | Mod: 26

## 2019-11-11 PROCEDURE — 99284 EMERGENCY DEPT VISIT MOD MDM: CPT

## 2019-11-11 PROCEDURE — 70486 CT MAXILLOFACIAL W/O DYE: CPT | Mod: 26

## 2019-11-11 PROCEDURE — 72125 CT NECK SPINE W/O DYE: CPT | Mod: 26

## 2019-11-11 RX ORDER — IBUPROFEN 200 MG
600 TABLET ORAL ONCE
Refills: 0 | Status: COMPLETED | OUTPATIENT
Start: 2019-11-11 | End: 2019-11-11

## 2019-11-11 RX ORDER — IBUPROFEN 200 MG
600 TABLET ORAL ONCE
Refills: 0 | Status: DISCONTINUED | OUTPATIENT
Start: 2019-11-11 | End: 2019-11-11

## 2019-11-11 RX ADMIN — Medication 600 MILLIGRAM(S): at 21:17
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
## 2019-11-11 NOTE — ED PROVIDER NOTE - PATIENT PORTAL LINK FT
You can access the FollowMyHealth Patient Portal offered by [facility] by registering at the following website: [email or web address]. By joining [facility]’s FollowMyHealth portal, you will also be able to view your health information using other applications (apps) compatible with our system.
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
## 2019-11-11 NOTE — PROVIDER CONTACT NOTE (OTHER) - RECOMMENDATIONS
ACS was called, accepted the report by Misty RG, Case ID # [id number]. ACS will call pt tonight. Copy of ACS report placed in the chart.
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
## 2019-11-11 NOTE — ED PROVIDER NOTE - NSFOLLOWUPINSTRUCTIONS_ED_ALL_ED_FT
Please follow up with your primary medical doctor in the next few days.     Please return to the ER with any questions or concerns.
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
## 2019-11-11 NOTE — ED PROVIDER NOTE - ENMT, MLM
normocepahlic atraumatic with no scalp hematomas, Airway patent, Nasal mucosa clear. Mouth with normal mucosa. Throat has no vesicles, no oropharyngeal exudates and uvula is midline.

## 2019-12-01 ENCOUNTER — OUTPATIENT (OUTPATIENT)
Dept: OUTPATIENT SERVICES | Facility: HOSPITAL | Age: 29
LOS: 1 days | End: 2019-12-01
Payer: MEDICAID

## 2019-12-01 PROCEDURE — G9001: CPT

## 2019-12-10 DIAGNOSIS — Z71.89 OTHER SPECIFIED COUNSELING: ICD-10-CM

## 2020-01-02 ENCOUNTER — APPOINTMENT (OUTPATIENT)
Dept: NEUROLOGY | Facility: CLINIC | Age: 30
End: 2020-01-02

## 2020-01-13 ENCOUNTER — MOBILE ON CALL (OUTPATIENT)
Age: 30
End: 2020-01-13

## 2021-05-13 ENCOUNTER — NON-APPOINTMENT (OUTPATIENT)
Age: 31
End: 2021-05-13

## 2021-05-13 RX ORDER — METHYLPREDNISOLONE 4 MG/1
4 TABLET ORAL
Qty: 1 | Refills: 0 | Status: ACTIVE | COMMUNITY
Start: 2021-05-13 | End: 1900-01-01

## 2021-06-06 NOTE — ED PROVIDER NOTE - NEUROLOGICAL, MLM
"Pharmacy Consult: Vancomycin Dosing    Pharmacist consulted to dose vancomycin for Harriett Romero, a 51 y.o. female.    Ordering provider: Dr. Rock    Indication for vancomycin therapy: Hospital Acquired Pneumonia    Goal Trough Range:  15-20 mcg/mL based on indication    Other current antimicrobials              vancomycin 1,000 mg in sodium chloride 0.9% 250 mL (VANCOCIN)  Every 12 hours          vancomycin 1,250 mg in sodium chloride 0.9% 500 mL (VANCOCIN)  Once          cefepime 2 g in NaCl 0.9 % 100 mL (MINI-BAG Plus) (MAXIPIME)  Every 12 hours                   Subjective/Objective:    Patient was admitted for Aspiration pneumonia of left lower lobe due to gastric secretions (H) on 3/1/2020    Height: 4' 10\" (1.473 m)    Actual Body Weight (ABW): 63 kg (139 lb)    Patient must be at least 60 in tall to calculate ideal body weight    BMI: Body mass index is 29.05 kg/m .    Allergies   Allergen Reactions     Macrobid [Nitrofurantoin Monohyd/M-Cryst] Nausea And Vomiting     Penicillins Hives     3/1/2020 tolerated ceftriaxone      Sulfa (Sulfonamide Antibiotics) Hives       Patient Active Problem List   Diagnosis     Obesity due to excess calories with serious comorbidity     Hypokalemia     Menopausal hot flushes     Migraine headache     Former smoker - quit in 2014     Insomnia     RLS (restless legs syndrome)     Anxiety     Hypothyroidism     Metabolic syndrome     Dyslipidemia     Vitamin D deficiency     At high risk for caregiver role strain     Major depression in complete remission (H)     History of Positive colorectal cancer screening using Cologuard test     Aspiration pneumonia of left lower lobe due to gastric secretions (H)     Aspiration pneumonia due to inhalation of vomitus (H)     Diarrhea of presumed infectious origin     Hypotension, unspecified hypotension type     Acute respiratory failure with hypoxia (H)     Cough     Chest pain at rest     Deep vein thrombosis (DVT) of upper " extremity (H)    Past Medical History:   Diagnosis Date     10 year risk of MI or stroke 1.0% in 2017      Anxiety      Aspiration pneumonia of left lower lobe due to gastric secretions (H) 2/28/2020     Current use of estrogen therapy      Dysthymia      Endometriosis     History - had hysterectomy.      Former smoker      Graves disease      Hypothyroidism      Insomnia      Migraine headache      Nephrolithiasis      Seizures (H)     5+ years ago, no medications     Sleep apnea     Recently diagnosed, awaiting CPAP     Trigeminal neuralgia      UTI (lower urinary tract infection)      Vitamin D deficiency         Temp Readings from Current Encounter:     03/11/20 0000 03/11/20 0400 03/11/20 0800   Temp: 99.8  F (37.7  C) 99.1  F (37.3  C) 98.3  F (36.8  C)     Net Intake/Output (last 24 hours):  I/O last 3 completed shifts:  In: 1745 [P.O.:1340; I.V.:184; IV Piggyback:221]  Out: 1625 [Urine:1625]    Recent Labs     03/08/20  1524 03/08/20  1915 03/09/20  0415 03/09/20  0415 03/10/20  0422 03/10/20  1330 03/10/20  1649 03/11/20  0742   WBC 21.5*  --   --  19.8* 20.6*  --  14.7* 14.8*   BUN  --  32* 32*  --   --  28*  --  25*   CREATININE  --  0.84 0.88  --   --  0.90  --  0.78     Estimated Creatinine Clearance: 84 mL/min (by C-G formula based on SCr of 0.78 mg/dL).    No results for input(s): CULTURE in the last 72 hours.    Results for orders placed or performed during the hospital encounter of 03/01/20   Culture, Stool    Collection Time: 03/03/20 10:28 AM    Specimen: Stool   Result Value Status    Culture No gram negative sarita isolated Final       No results for input(s): VANCOMYCIN in the last 168 hours.    Vancomycin administrations: (last 120 hours)     None          Assessment/Plan:    Pharmacist consulted to dose vancomycin for Hospital Acquired Pneumonia, goal trough range 15-20 mcg/mL.  1. Initiate vancomycin 1250 mg IV once (19.8 mg/kg) followed by vancomycin 1000 mg IV every 12 hours (15.9 mg/kg  actual body weight).  2. No vancomycin level available for assessment.  3. Pharmacist will plan to check a vancomycin trough level prior to the 4th or 5th dose.  4. Pharmacist will continue to follow.    Thank you for the consult.  Salvador Ashley, PharmD 3/11/2020 2:56 PM         Alert and oriented, no focal deficits, no motor or sensory deficits.

## 2021-06-15 ENCOUNTER — NON-APPOINTMENT (OUTPATIENT)
Age: 31
End: 2021-06-15

## 2021-06-21 ENCOUNTER — NON-APPOINTMENT (OUTPATIENT)
Age: 31
End: 2021-06-21

## 2021-06-21 RX ORDER — METHYLPREDNISOLONE 4 MG/1
4 TABLET ORAL
Qty: 1 | Refills: 0 | Status: ACTIVE | COMMUNITY
Start: 2021-06-21 | End: 1900-01-01

## 2021-06-21 RX ORDER — FAMOTIDINE 20 MG/1
20 TABLET, FILM COATED ORAL
Qty: 30 | Refills: 2 | Status: ACTIVE | COMMUNITY
Start: 2021-06-21 | End: 1900-01-01

## 2021-07-07 NOTE — ED ADULT NURSE NOTE - NS ED NOTE ABUSE SUSPICION NEGLECT YN
[facility] PROGRESS NOTE    7/7/2021 10:18 AM        Name: Kaelyn Corey . Admitted: 7/5/2021  Primary Care Provider: Sreekanth Nobles MD (Tel: [phone])                        Subjective:  . No acute events overnight. Resting well. Pain control. Diet ok. Labs reviewed  Denies any chest pain sob.      Reviewed interval ancillary notes    Current Medications  triamterene-hydroCHLOROthiazide (MAXZIDE-25) 37.5-25 MG per tablet 1 tablet, Daily  docusate sodium (COLACE) capsule 100 mg, BID  tamsulosin (FLOMAX) capsule 0.4 mg, Daily  aspirin chewable tablet 81 mg, Daily  atorvastatin (LIPITOR) tablet 80 mg, Daily  metoprolol succinate (TOPROL XL) extended release tablet 50 mg, Daily  pantoprazole (PROTONIX) tablet 40 mg, QAM AC  cefTRIAXone (ROCEPHIN) 1000 mg in sterile water 10 mL IV syringe, Q24H  sodium chloride flush 0.9 % injection 5-40 mL, 2 times per day  sodium chloride flush 0.9 % injection 5-40 mL, PRN  0.9 % sodium chloride infusion, PRN  enoxaparin (LOVENOX) injection 40 mg, Daily  ondansetron (ZOFRAN-ODT) disintegrating tablet 4 mg, Q8H PRN   Or  ondansetron (ZOFRAN) injection 4 mg, Q6H PRN  polyethylene glycol (GLYCOLAX) packet 17 g, Daily PRN  acetaminophen (TYLENOL) tablet 650 mg, Q6H PRN   Or  acetaminophen (TYLENOL) suppository 650 mg, Q6H PRN  morphine (PF) injection 2 mg, Q4H PRN        Objective:  BP (!) 122/59   Pulse 96   Temp 97.5 °F (36.4 °C) (Temporal)   Resp 14   Ht 5' 2\" (1.575 m)   Wt 177 lb 9.6 oz (80.6 kg)   SpO2 96%   BMI 32.48 kg/m²     Intake/Output Summary (Last 24 hours) at 7/7/2021 1018  Last data filed at 7/6/2021 1847  Gross per 24 hour   Intake --   Output 300 ml   Net -300 ml      Wt Readings from Last 3 Encounters:   07/07/21 177 lb 9.6 oz (80.6 kg)   04/13/21 194 lb (88 kg)   10/27/20 174 lb 12.8 oz (79.3 kg)       General appearance:  Appears comfortable  Eyes: Sclera clear. Pupils equal.  ENT: Moist oral mucosa. Trachea midline, no adenopathy. Cardiovascular: Regular rhythm, normal S1, S2. No murmur. No edema in lower extremities  Respiratory: Not using accessory muscles. Good inspiratory effort. Clear to auscultation bilaterally, no wheeze or crackles. GI: Abdomen soft, no tenderness, not distended, normal bowel sounds  Musculoskeletal: No cyanosis in digits, neck supple  Neurology: CN 2-12 grossly intact. No speech or motor deficits  Psych: Normal affect. Alert and oriented in time, place and person  Skin: Warm, dry, normal turgor    Labs and Tests:  CBC:   Recent Labs     07/05/21  1419 07/06/21 0622   WBC 6.2 5.3   HGB 14.0 13.9    234     BMP:    Recent Labs     07/05/21  1419 07/06/21 0622    141   K 3.5 3.6    105   CO2 26 27   BUN 11 9   CREATININE <0.5* <0.5*   GLUCOSE 88 89     Hepatic:   Recent Labs     07/05/21  1419 07/06/21 0622   AST 33 27   ALT 28 23   BILITOT 2.1* 1.4*   ALKPHOS 74 65       Discussed care with family and patient             Spent 30  minutes with patient and family at bedside and on unit reviewing medical records and labs, spent greater than 50% time counseling patient and family on diagnosis and plan   Problem List  Active Problems:    Chest pain  Resolved Problems:    * No resolved hospital problems. *       Assessment & Plan:   Chest pain  -With shortness of breath.    -Initially a troponin of 0.05 which has remained flat with no EKG changes  -Plan for left heart cath today.       Left ureteral stone with mild hydronephrosis  - just having minimal pain no other concerns  -Conservative management    UTI  -Continue Rocephin for now    CAD  atrial fibrillation.  -No anticoagulation only on aspirin    Diet: Diet NPO  Code:Full Code  DVT PPX lovenox       Ericka Ibrahim MD   7/7/2021 10:18 AM No

## 2021-07-12 NOTE — PATIENT PROFILE OB - NS PRO PT RIGHT SUPPORT PERSON
Fever greater than (need to indicate Fahrenheit or Celsius)/Inability to tolerate liquids or foods
Declines

## 2021-07-14 ENCOUNTER — APPOINTMENT (OUTPATIENT)
Dept: NEUROLOGY | Facility: CLINIC | Age: 31
End: 2021-07-14
Payer: MEDICAID

## 2021-07-14 VITALS
HEIGHT: 68 IN | WEIGHT: 218 LBS | SYSTOLIC BLOOD PRESSURE: 113 MMHG | HEART RATE: 84 BPM | DIASTOLIC BLOOD PRESSURE: 72 MMHG | BODY MASS INDEX: 33.04 KG/M2

## 2021-07-14 DIAGNOSIS — G08 INTRACRANIAL AND INTRASPINAL PHLEBITIS AND THROMBOPHLEBITIS: ICD-10-CM

## 2021-07-14 DIAGNOSIS — G43.909 MIGRAINE, UNSPECIFIED, NOT INTRACTABLE, W/OUT STATUS MIGRAINOSUS: ICD-10-CM

## 2021-07-14 PROCEDURE — 99215 OFFICE O/P EST HI 40 MIN: CPT

## 2021-07-14 RX ORDER — TOPIRAMATE 25 MG/1
25 TABLET, FILM COATED ORAL
Qty: 60 | Refills: 1 | Status: ACTIVE | COMMUNITY
Start: 2021-07-14 | End: 1900-01-01

## 2021-07-14 RX ORDER — METOCLOPRAMIDE 10 MG/1
10 TABLET ORAL
Qty: 30 | Refills: 1 | Status: ACTIVE | COMMUNITY
Start: 2021-07-14 | End: 1900-01-01

## 2021-07-14 NOTE — HISTORY OF PRESENT ILLNESS
[FreeTextEntry1] : Patient reports that she had a very bad stretch for about 6-7 weeks of daily headaches from early may thru end of June. She was doing a lot of traveling via plane in the [place] and the pain was unbearable. She also had nausea and vomiting and light and sound sensitivity. She was managing with motrin and advil.\par She had called our office last month and I had prescribed medrol dose shen which helped temporarily\par \par Her current pain and headache is 0/10. \par \par She had stopped the propranolol 10 mg daily as a preventative

## 2021-07-14 NOTE — REASON FOR VISIT
[Follow-Up: _____] : a [unfilled] follow-up visit [FreeTextEntry1] : headaches and hx of venous sinus thrombosis

## 2021-07-14 NOTE — REVIEW OF SYSTEMS
[Fever] : no fever [Chills] : no chills [Feeling Tired] : not feeling tired [As Noted in HPI] : as noted in HPI [Dizziness] : no dizziness [Lightheadedness] : no lightheadedness [Migraine Headache] : no migraine headache [Negative] : Heme/Lymph

## 2021-07-14 NOTE — PHYSICAL EXAM
[General Appearance - Alert] : alert [Oriented To Time, Place, And Person] : oriented to person, place, and time [Person] : oriented to person [Place] : oriented to place [Time] : oriented to time [Cranial Nerves Optic (II)] : visual acuity intact bilaterally,  visual fields full to confrontation, pupils equal round and reactive to light [Cranial Nerves Oculomotor (III)] : extraocular motion intact [Cranial Nerves Trigeminal (V)] : facial sensation intact symmetrically [Cranial Nerves Facial (VII)] : face symmetrical [Cranial Nerves Vestibulocochlear (VIII)] : hearing was intact bilaterally [Cranial Nerves Glossopharyngeal (IX)] : tongue and palate midline [Cranial Nerves Accessory (XI - Cranial And Spinal)] : head turning and shoulder shrug symmetric [Cranial Nerves Hypoglossal (XII)] : there was no tongue deviation with protrusion [Motor Tone] : muscle tone was normal in all four extremities [Motor Strength] : muscle strength was normal in all four extremities [Involuntary Movements] : no involuntary movements were seen [Motor Handedness Right-Handed] : the patient is right hand dominant [Paresis Pronator Drift Right-Sided] : no pronator drift on the right [Paresis Pronator Drift Left-Sided] : no pronator drift on the left [Sensation Tactile Decrease] : light touch was intact [Sensation Pain / Temperature Decrease] : pain and temperature was intact [Sensation Vibration Decrease] : vibration was intact [Romberg's Sign] : Romberg's sign was negtive [Limited Balance] : balance was intact [Past-pointing] : there was no past-pointing [Tremor] : no tremor present [Coordination - Dysmetria Impaired Finger-to-Nose Bilateral] : not present [2+] : Ankle jerk left 2+ [Plantar Reflex Right Only] : normal on the right [Plantar Reflex Left Only] : normal on the left [FreeTextEntry8] : patient able to tandem walk  [PERRL With Normal Accommodation] : pupils were equal in size, round, reactive to light, with normal accommodation [Extraocular Movements] : extraocular movements were intact [Full Visual Field] : full visual field [Neck Appearance] : the appearance of the neck was normal [] : no rash [Skin Lesions] : no skin lesions

## 2021-07-26 RX ORDER — UBROGEPANT 50 MG/1
50 TABLET ORAL
Qty: 6 | Refills: 1 | Status: ACTIVE | COMMUNITY
Start: 2021-07-14 | End: 1900-01-01

## 2021-07-28 ENCOUNTER — NON-APPOINTMENT (OUTPATIENT)
Age: 31
End: 2021-07-28

## 2021-08-13 NOTE — DISCUSSION/SUMMARY
Please let patient know that urine culture order has been placed.   [FreeTextEntry1] : 1. Hx of chronic headaches and dural venous thrombosis : will obtain follow up MRI brain and MRV head with contrast \par \par 2. For headache prevention will start topamax 25 mg daily and for prevention with reglan 10mg as needed as needed and ubrevely 50mg for abortive as needed. \par \par 3. Continue headache diary and avoid triggers \par \par 4. Follow up in 2 months

## 2021-09-23 ENCOUNTER — APPOINTMENT (OUTPATIENT)
Dept: NEUROLOGY | Facility: CLINIC | Age: 31
End: 2021-09-23

## 2022-01-11 NOTE — ED PROVIDER NOTE - PSH
Patient contacted regarding COVID-19 risk, exposure. Discussed COVID-19 related testing which was available at this time. Test results were negative. Patient informed of results, if available? yes. LPN Care Coordinator contacted the parent mother Klaudia Powell  by telephone to perform post discharge assessment. Call within 2 business days of discharge: Yes Verified name and  with parent as identifiers. Provided introduction to self, and explanation of the CTN/ACM role, and reason for call due to risk factors for infection and/or exposure to COVID-19. Symptoms reviewed with parent who verbalized the following symptoms: no new symptoms      Due to no new or worsening symptoms encounter was not routed to provider for escalation. Discussed follow-up appointments. If no appointment was previously scheduled, appointment scheduling offered:  no. [facility] follow up appointment(s): No future appointments. Non-[facility] follow up appointment(s): advised to follow up with Pediatrician    Interventions to address risk factors: Education of patient/family/caregiver/guardian to support self-management-VDH and covid numbers given     Advance Care Planning:   Does patient have an Advance Directive: not on file. Educated patient about risk for severe COVID-19 due to risk factors according to CDC guidelines. LPN CC reviewed discharge instructions, medical action plan and red flag symptoms with the parent who verbalized understanding. Discussed COVID vaccination status: no. Education provided on COVID-19 vaccination as appropriate. Discussed exposure protocols and quarantine with CDC Guidelines. Parent was given an opportunity to verbalize any questions and concerns and agrees to contact LPN CC or health care provider for questions related to their healthcare. Reviewed and educated parent on any new and changed medications related to discharge diagnosis     Was patient discharged with a pulse oximeter?  no   LPN CC provided contact information. Plan for follow-up call in 5-7 days based on severity of symptoms and risk factors. No significant past surgical history

## 2022-10-24 NOTE — ED ADULT TRIAGE NOTE - RESPIRATORY RATE (BREATHS/MIN)
Loma Sever is here at 35w3d for:    Chief Complaint   Patient presents with    Routine Prenatal Visit     Pt states no issues or concerns        Estimated Due Date: Estimated Date of Delivery: 22    OB History    Para Term  AB Living   5 4 3 1   4   SAB IAB Ectopic Molar Multiple Live Births           0 4      # Outcome Date GA Lbr Taz/2nd Weight Sex Delivery Anes PTL Lv   5 Current            4 Term 18 39w1d  7 lb 2.3 oz (3.239 kg) F CS-LTranv Spinal  DAY   3 Term 04/10/15 39w0d  8 lb 4 oz (3.742 kg) M CS-LTranv Spinal N DAY   2 Term 14 39w2d  7 lb 6.6 oz (3.362 kg) M CS-LTranv Spinal N DAY   1  13 36w0d  6 lb 3.8 oz (2.829 kg) M CS-LTranv Spinal N DAY      Complications: Fetal Intolerance, Autism        Past Medical History:   Diagnosis Date    Herpes        Past Surgical History:   Procedure Laterality Date     SECTION, LOW TRANSVERSE  2013     SECTION, LOW TRANSVERSE  2014     SECTION, LOW TRANSVERSE  4/10/2015    SD  DELIVERY ONLY N/A 2018     SECTION-REPEAT performed [facility]razas [facility]       Social History     Tobacco Use   Smoking Status Former    Packs/day: 0.25    Years: 0.50    Pack years: 0.13    Types: Cigarettes    Quit date: 6/10/2014    Years since quittin.3   Smokeless Tobacco Never        Social History     Substance and Sexual Activity   Alcohol Use No    Alcohol/week: 0.0 standard drinks       No results found for this visit on 10/24/22. HPI: Doing well today. Patient denies needs or concerns at this time.   Patient states that on Saturday she did have some sharp Pain on her left lower quadrant however after about an hour it went away and baby was still moving around well so she did not come in    Yes PT denies fever, chills, nausea and vomiting       Vitals:  Estimated body mass index is 30.79 kg/m² as calculated from the following:    Height as of 22: 5' 5\" (1.651 m). Weight as of this encounter: 185 lb (83.9 kg). BP: 112/70  Weight: 185 lb (83.9 kg)  Heart Rate: 69  Patient Position: Sitting  Albumin: Negative  Glucose: Negative  Fundal Height (cm): 35 cm  Fetal HR: 142  Movement: Present           Abdomen: soft    Results reviewed today:    No results found for this visit on 10/24/22. ASSESSMENT & Plan    Diagnosis Orders   1. 35 weeks gestation of pregnancy        2. Encounter for supervision of other normal pregnancy, third trimester                  I am having Molly LeavittBriana Ervin maintain her Prenatal MV-Min-Fe Fum-FA-DHA (PRENATAL 1 PO) and Multiple Vitamins-Minerals (MULTIVITAMIN WOMEN PO). Return in about 1 week (around 10/31/2022) for 1 wk ob & 2 wk ob untill c/s. There are no Patient Instructions on file for this visit. Clinical References           Weeks 34 to 39 of Your Pregnancy: Care Instructions  Your belly has grown quite large. It's almost time to give birth! Your baby's lungs are almost ready to breathe air. The skull bones are firm enough to protect your baby's head. But they're soft enough to move down through the birth canal.  You might be wondering what to expect during labor. Because each birth is different, there's no way to know exactly what childbirth will be like for you. Talk to your doctor or midwife about any concerns you have. Bunny Ruelas probably have a test for group B streptococcus (GBS). GBS is bacteria that can live in the vagina and rectum. GBS can make your baby sick after birth. If you test positive, you'll get antibiotics during labor. Choose what type of pain relief you would like during labor. You can choose from a few types, including medicine and non-medicine options. You may want to use several types of pain relief. Know how medicines can help with pain during labor. Some medicines lower anxiety and help with some of the pain.  Others make your lower body numb so that you will feel less pain.     Tell your doctor about your pain medicine choice. Do this before you start labor or very early in your labor. You may be able to change your mind during labor. Learn about the stages of labor. The first stage includes early labor, active labor, and transition. Contractions start in early labor. During active labor, contractions get stronger, last longer, and happen more often. In transition, your cervix stretches and contractions are very strong. The second stage starts when you're ready to push. During this stage, your baby is born. During the third stage, you'll have a few more contractions to push out the placenta. Follow-up care is a key part of your treatment and safety. Be sure to make and go to all appointments, and call your doctor if you are having problems. It's also a good idea to know your test results and keep a list of the medicines you take. Where can you learn more? Go to [email or web address] and sign in to your [employer] account. Enter M712 in the Docker box to learn more about \"Weeks 34 to 36 of Your Pregnancy: Care Instructions. \"     If you do not have an account, please click on the \"Sign Up Now\" link. Current as of: February 23, 2022               Content Version: 13.4  © 3354-9388 [employer], [employer]. Care instructions adapted under license by [facility] ([facility]). If you have questions about a medical condition or this instruction, always ask your healthcare professional. [place] [address] any warranty or liability for your use of this information.                       REYNALDO Lynn CNM,10/24/2022 9:40 AM 20

## 2022-11-18 NOTE — PROGRESS NOTE ADULT - ASSESSMENT
28 yo female presents with labor. History of venous sinus thrombosis. Now completely asymptomatic. MRI Venogram shows stentic vein with a possible partial thrombus. Given patient has no underlying hematologic disorder would recommend holding AC for now and have her f/u as outpatient    - F/u Dr. Emre Garcia [phone]  - hematology w/u as outpatient  - May need prophylactic AC if she becomes pregnant again 26 yo female presents with labor. History of venous sinus thrombosis. Now completely asymptomatic. MRI Venogram shows stentic vein with a possible partial thrombus. patient has no underlying hematologic disorder would recommend  - 12 weeks of anticoagulation followed by taper  - F/u Dr. Emre Garcia [phone]  - hematology w/u as outpatient  - May need prophylactic AC if she becomes pregnant again Statement Selected

## 2023-05-08 NOTE — PATIENT PROFILE ADULT. - FUNCTIONAL SCREEN CURRENT LEVEL: COMMUNICATION, MLM
Patient returned call to our office. Medication sent to pharmacy. Patient will want hard copy of lab orders when she is here for a follow up appointment on 5/12/2023.   (0) understands/communicates without difficulty

## 2023-09-06 NOTE — PATIENT PROFILE ADULT. - NS TRANSFER DISPOSITION PATIENT BELONGINGS
----- Message from Pedro Jack MD sent at 9/6/2023  1:16 PM CDT -----  Regarding: Hospital follow up  With : Guero  When : 10/5/23  Dx : Osteomyelitis     with patient

## 2023-11-14 NOTE — PROGRESS NOTE ADULT - SUBJECTIVE AND OBJECTIVE BOX
Neurology Follow up note    Name  JULISSA HYATT    HPI:  27 year old female at 11 weeks gestation (), with PMH of Migraine headaches, sent to the ED by ophthalmologist secondary to Papilledema.  Seen and evaluated at bedside; NAD.  Lying supine in bed in the darkness secondary to worsening headaches with any light.  Patient relates that some weeks ago, she began to experience right sided headache associated with neck pain and rhinorrhea.   Pain would start in the late evening (around 8:00 PM) and last for a few hours, then dissipate.  However pain became constant over time, progressed to involve the entire head, and has been intense for the past 2 months.  Endorses B/L ophthalmalgia, along with photophobia and phonophobia to some sounds at times.  Headache at times worsens just after getting up.  Has blurred vision, which is pronounced at times, then improves to baseline blurred state.  On reclining at times, patient hears annoying ocean -like sounds in the ears (alternately; never simultaneous involvement).  Patient presented to the [facility] ED on 2017 and underwent MRI of the head without contrast and MRI Venogram of the head without contrast; both studies were negative for any acute pathology.  After discharge, patient followed up with her PMD and OB/GYN; referred to ophthalmologist and neurologist by OB/GYN.  Patient saw an ophthalmologist today, who sent her to the ED for evaluation of Papilledema.  Neurology appointment was scheduled for next week.  No prior similar episodes, although patient has been suffering with migraine headaches for years.  No reports of fevers, chills, neurological deficits.  Had episode of NBNB vomiting 3 days ago.    Notedly diagnosed with UTI (Klebsiella pneumoniae) during the ED visit on 2017.  Prescribed Macrobid initially and then called by ED for change in antibiotic to Cipro.  Inadequate intake of antibiotic, however, because of nausea/vomiting and patient only resumed the therapy yesterday.  Notes burning with micturition prior to ED.    Vital signs upon ED presentation as follows: BP = 126/72, HR = 76, RR = 16, T = 36.9 C (98.4 F), O2 Sat = 100% on RA.  Diagnosed with Headache and 11 Weeks of Pregnancy.  Prescribed Metoclopramide 10 mg IV x one in the ED. (2017 03:02)      Interval History - CTV performed and shows Thrombus again identified involving the lateral aspect of the right and transverse sinus with thrombus now seen involving the right   sigmoid sinus. Patient has been restarted on Lovenox BID            MEDICATIONS  (STANDING):  sodium chloride 0.9%. 1000 milliLiter(s) (100 mL/Hr) IV Continuous <Continuous>  sodium chloride 0.9%. 1000 milliLiter(s) (100 mL/Hr) IV Continuous <Continuous>  prenatal multivitamin 1 Tablet(s) Oral daily  enoxaparin Injectable 140 milliGRAM(s) SubCutaneous daily    MEDICATIONS  (PRN):      Allergies    Benadryl (Hives)  cortisone (Hives)  pcn (Unknown)  Tylenol (Hives)  Tylenol (Urticaria)  Zofran (Anaphylaxis)  Zofran (Rash)    Intolerances        Objective:   Vital Signs Last 24 Hrs  T(C): 36.9 (2017 10:03), Max: 37.1 (2017 01:44)  T(F): 98.4 (2017 10:03), Max: 98.8 (2017 01:44)  HR: 84 (2017 10:03) (73 - 88)  BP: 107/75 (2017 10:03) (101/63 - 120/58)  BP(mean): --  RR: 17 (2017 10:03) (16 - 17)  SpO2: 100% (2017 10:03) (99% - 100%)    General Exam:   General appearance: No acute distress                     Neurological Exam:  Mental Status: Orientated to self, date and place.  Attention intact.  No dysarthria, aphasia or neglect.     Cranial Nerves: CN I - not tested.  PERRL, EOMI, VFF, CN V1-3 intact to light touch and pinprick.  No facial asymmetry.  Hearing intact to finger rub bilaterally.  Tongue, uvula and palate midline.  Sternocleidomastoid and Trapezius intact bilaterally.    Motor:   Tone: normal.                  Strength: intact throughout  Pronator drift: none                 Dysmerria: None to finger-nose-finger      Sensation: intact to light touch throughout  Gait: Deferred    Other:    -    140  |  100  |  6<L>  ----------------------------<  82  4.1   |  25  |  0.62    Ca    8.8      2017 07:00      07-05    140  |  100  |  6<L>  ----------------------------<  82  4.1   |  25  |  0.62    Ca    8.8      2017 07:00          Radiology    EKG:  tele:  TTE:  EEG: none

## 2025-03-17 NOTE — ED PROCEDURE NOTE - GENERAL PROCEDURE NAME
Her/She POCUS: Emergency Department Focused Ultrasound performed at patient's bedside.  The complete report can be found in the patient's chart.